# Patient Record
Sex: FEMALE | Race: OTHER | HISPANIC OR LATINO | Employment: UNEMPLOYED | URBAN - METROPOLITAN AREA
[De-identification: names, ages, dates, MRNs, and addresses within clinical notes are randomized per-mention and may not be internally consistent; named-entity substitution may affect disease eponyms.]

---

## 2017-04-04 ENCOUNTER — APPOINTMENT (EMERGENCY)
Dept: CT IMAGING | Facility: HOSPITAL | Age: 27
End: 2017-04-04

## 2017-04-04 ENCOUNTER — APPOINTMENT (EMERGENCY)
Dept: RADIOLOGY | Facility: HOSPITAL | Age: 27
End: 2017-04-04

## 2017-04-04 ENCOUNTER — HOSPITAL ENCOUNTER (EMERGENCY)
Facility: HOSPITAL | Age: 27
Discharge: HOME | End: 2017-04-05
Attending: EMERGENCY MEDICINE | Admitting: EMERGENCY MEDICINE

## 2017-04-04 DIAGNOSIS — R10.11 RIGHT UPPER QUADRANT ABDOMINAL PAIN: Primary | ICD-10-CM

## 2017-04-04 LAB
ALBUMIN SERPL BCP-MCNC: 3.7 G/DL (ref 3.5–5)
ALP SERPL-CCNC: 102 U/L (ref 46–116)
ALT SERPL W P-5'-P-CCNC: 17 U/L (ref 12–78)
ANION GAP SERPL CALCULATED.3IONS-SCNC: 8 MMOL/L (ref 4–13)
APTT PPP: 28 SECONDS (ref 24–36)
AST SERPL W P-5'-P-CCNC: 17 U/L (ref 5–45)
BASOPHILS # BLD AUTO: 0.03 THOUSANDS/ΜL (ref 0–0.1)
BASOPHILS NFR BLD AUTO: 0 % (ref 0–1)
BILIRUB SERPL-MCNC: 0.2 MG/DL (ref 0.2–1)
BUN SERPL-MCNC: 11 MG/DL (ref 5–25)
CALCIUM SERPL-MCNC: 9.1 MG/DL (ref 8.3–10.1)
CHLORIDE SERPL-SCNC: 101 MMOL/L (ref 100–108)
CO2 SERPL-SCNC: 30 MMOL/L (ref 21–32)
CREAT SERPL-MCNC: 0.77 MG/DL (ref 0.6–1.3)
EOSINOPHIL # BLD AUTO: 0.46 THOUSAND/ΜL (ref 0–0.61)
EOSINOPHIL NFR BLD AUTO: 3 % (ref 0–6)
ERYTHROCYTE [DISTWIDTH] IN BLOOD BY AUTOMATED COUNT: 13.2 % (ref 11.6–15.1)
GFR SERPL CREATININE-BSD FRML MDRD: >60 ML/MIN/1.73SQ M
GLUCOSE SERPL-MCNC: 117 MG/DL (ref 65–140)
HCT VFR BLD AUTO: 42.9 % (ref 34.8–46.1)
HGB BLD-MCNC: 14.3 G/DL (ref 11.5–15.4)
INR PPP: 1 (ref 0.86–1.16)
LYMPHOCYTES # BLD AUTO: 4.8 THOUSANDS/ΜL (ref 0.6–4.47)
LYMPHOCYTES NFR BLD AUTO: 35 % (ref 14–44)
MCH RBC QN AUTO: 28.1 PG (ref 26.8–34.3)
MCHC RBC AUTO-ENTMCNC: 33.3 G/DL (ref 31.4–37.4)
MCV RBC AUTO: 84 FL (ref 82–98)
MONOCYTES # BLD AUTO: 1.15 THOUSAND/ΜL (ref 0.17–1.22)
MONOCYTES NFR BLD AUTO: 8 % (ref 4–12)
NEUTROPHILS # BLD AUTO: 7.4 THOUSANDS/ΜL (ref 1.85–7.62)
NEUTS SEG NFR BLD AUTO: 54 % (ref 43–75)
PLATELET # BLD AUTO: 346 THOUSANDS/UL (ref 149–390)
PMV BLD AUTO: 9.4 FL (ref 8.9–12.7)
POTASSIUM SERPL-SCNC: 3.5 MMOL/L (ref 3.5–5.3)
PROT SERPL-MCNC: 8 G/DL (ref 6.4–8.2)
PROTHROMBIN TIME: 13 SECONDS (ref 12–14.3)
RBC # BLD AUTO: 5.09 MILLION/UL (ref 3.81–5.12)
SODIUM SERPL-SCNC: 139 MMOL/L (ref 136–145)
WBC # BLD AUTO: 13.84 THOUSAND/UL (ref 4.31–10.16)

## 2017-04-04 PROCEDURE — 93005 ELECTROCARDIOGRAM TRACING: CPT

## 2017-04-04 PROCEDURE — 85730 THROMBOPLASTIN TIME PARTIAL: CPT | Performed by: EMERGENCY MEDICINE

## 2017-04-04 PROCEDURE — 84702 CHORIONIC GONADOTROPIN TEST: CPT | Performed by: EMERGENCY MEDICINE

## 2017-04-04 PROCEDURE — 80053 COMPREHEN METABOLIC PANEL: CPT | Performed by: EMERGENCY MEDICINE

## 2017-04-04 PROCEDURE — 74177 CT ABD & PELVIS W/CONTRAST: CPT

## 2017-04-04 PROCEDURE — 83605 ASSAY OF LACTIC ACID: CPT | Performed by: EMERGENCY MEDICINE

## 2017-04-04 PROCEDURE — 85025 COMPLETE CBC W/AUTO DIFF WBC: CPT | Performed by: EMERGENCY MEDICINE

## 2017-04-04 PROCEDURE — 36415 COLL VENOUS BLD VENIPUNCTURE: CPT | Performed by: EMERGENCY MEDICINE

## 2017-04-04 PROCEDURE — 96374 THER/PROPH/DIAG INJ IV PUSH: CPT

## 2017-04-04 PROCEDURE — 85610 PROTHROMBIN TIME: CPT | Performed by: EMERGENCY MEDICINE

## 2017-04-04 PROCEDURE — 93005 ELECTROCARDIOGRAM TRACING: CPT | Performed by: EMERGENCY MEDICINE

## 2017-04-04 PROCEDURE — 83690 ASSAY OF LIPASE: CPT | Performed by: EMERGENCY MEDICINE

## 2017-04-04 PROCEDURE — 96375 TX/PRO/DX INJ NEW DRUG ADDON: CPT

## 2017-04-04 PROCEDURE — 71020 HB CHEST X-RAY 2VW FRONTAL&LATL: CPT

## 2017-04-04 PROCEDURE — 96361 HYDRATE IV INFUSION ADD-ON: CPT

## 2017-04-04 RX ORDER — SODIUM CHLORIDE 9 MG/ML
125 INJECTION, SOLUTION INTRAVENOUS CONTINUOUS
Status: DISCONTINUED | OUTPATIENT
Start: 2017-04-04 | End: 2017-04-05 | Stop reason: HOSPADM

## 2017-04-04 RX ORDER — ONDANSETRON 2 MG/ML
4 INJECTION INTRAMUSCULAR; INTRAVENOUS ONCE
Status: COMPLETED | OUTPATIENT
Start: 2017-04-04 | End: 2017-04-04

## 2017-04-04 RX ORDER — ACETAMINOPHEN 325 MG/1
975 TABLET ORAL ONCE
Status: COMPLETED | OUTPATIENT
Start: 2017-04-05 | End: 2017-04-05

## 2017-04-04 RX ADMIN — HYDROMORPHONE HYDROCHLORIDE 0.5 MG: 1 INJECTION, SOLUTION INTRAMUSCULAR; INTRAVENOUS; SUBCUTANEOUS at 23:36

## 2017-04-04 RX ADMIN — ONDANSETRON 4 MG: 2 INJECTION INTRAMUSCULAR; INTRAVENOUS at 23:35

## 2017-04-04 RX ADMIN — SODIUM CHLORIDE 1000 ML: 0.9 INJECTION, SOLUTION INTRAVENOUS at 23:30

## 2017-04-04 RX ADMIN — FAMOTIDINE 20 MG: 10 INJECTION, SOLUTION INTRAVENOUS at 23:39

## 2017-04-04 RX ADMIN — IOHEXOL 50 ML: 240 INJECTION, SOLUTION INTRATHECAL; INTRAVASCULAR; INTRAVENOUS; ORAL at 23:45

## 2017-04-05 VITALS
WEIGHT: 169.31 LBS | OXYGEN SATURATION: 99 % | RESPIRATION RATE: 18 BRPM | SYSTOLIC BLOOD PRESSURE: 137 MMHG | DIASTOLIC BLOOD PRESSURE: 63 MMHG | TEMPERATURE: 97.7 F | BODY MASS INDEX: 33.07 KG/M2 | HEART RATE: 105 BPM

## 2017-04-05 LAB
ATRIAL RATE: 80 BPM
B-HCG SERPL-ACNC: <2 MIU/ML
BACTERIA UR QL AUTO: ABNORMAL /HPF
BILIRUB UR QL STRIP: NEGATIVE
CLARITY UR: CLEAR
COLOR UR: YELLOW
GLUCOSE UR STRIP-MCNC: NEGATIVE MG/DL
HGB UR QL STRIP.AUTO: ABNORMAL
KETONES UR STRIP-MCNC: NEGATIVE MG/DL
LACTATE SERPL-SCNC: 1.5 MMOL/L (ref 0.5–2)
LEUKOCYTE ESTERASE UR QL STRIP: NEGATIVE
LIPASE SERPL-CCNC: 155 U/L (ref 73–393)
MUCOUS THREADS UR QL AUTO: ABNORMAL
NITRITE UR QL STRIP: NEGATIVE
NON-SQ EPI CELLS URNS QL MICRO: ABNORMAL /HPF
P AXIS: 45 DEGREES
PH UR STRIP.AUTO: 5.5 [PH] (ref 4.5–8)
PR INTERVAL: 144 MS
PROT UR STRIP-MCNC: ABNORMAL MG/DL
QRS AXIS: 80 DEGREES
QRSD INTERVAL: 82 MS
QT INTERVAL: 348 MS
QTC INTERVAL: 401 MS
RBC #/AREA URNS AUTO: ABNORMAL /HPF
SP GR UR STRIP.AUTO: 1.02 (ref 1–1.03)
T WAVE AXIS: 55 DEGREES
UROBILINOGEN UR QL STRIP.AUTO: 1 E.U./DL
VENTRICULAR RATE: 80 BPM
WBC #/AREA URNS AUTO: ABNORMAL /HPF

## 2017-04-05 PROCEDURE — 96376 TX/PRO/DX INJ SAME DRUG ADON: CPT

## 2017-04-05 PROCEDURE — 81001 URINALYSIS AUTO W/SCOPE: CPT | Performed by: EMERGENCY MEDICINE

## 2017-04-05 PROCEDURE — 99285 EMERGENCY DEPT VISIT HI MDM: CPT

## 2017-04-05 PROCEDURE — 96361 HYDRATE IV INFUSION ADD-ON: CPT

## 2017-04-05 PROCEDURE — 87086 URINE CULTURE/COLONY COUNT: CPT | Performed by: EMERGENCY MEDICINE

## 2017-04-05 RX ORDER — ONDANSETRON 2 MG/ML
4 INJECTION INTRAMUSCULAR; INTRAVENOUS ONCE
Status: COMPLETED | OUTPATIENT
Start: 2017-04-05 | End: 2017-04-05

## 2017-04-05 RX ORDER — METOCLOPRAMIDE 10 MG/1
10 TABLET ORAL 4 TIMES DAILY
Qty: 28 TABLET | Refills: 0 | Status: SHIPPED | OUTPATIENT
Start: 2017-04-05 | End: 2017-04-12

## 2017-04-05 RX ORDER — OXYCODONE HYDROCHLORIDE AND ACETAMINOPHEN 5; 325 MG/1; MG/1
1 TABLET ORAL EVERY 4 HOURS PRN
Qty: 20 TABLET | Refills: 0 | Status: SHIPPED | OUTPATIENT
Start: 2017-04-05 | End: 2017-04-15

## 2017-04-05 RX ADMIN — ACETAMINOPHEN 975 MG: 325 TABLET ORAL at 01:09

## 2017-04-05 RX ADMIN — IOHEXOL 100 ML: 350 INJECTION, SOLUTION INTRAVENOUS at 01:45

## 2017-04-05 RX ADMIN — ONDANSETRON 4 MG: 2 INJECTION INTRAMUSCULAR; INTRAVENOUS at 01:03

## 2017-04-06 LAB — BACTERIA UR CULT: NORMAL

## 2017-06-04 ENCOUNTER — HOSPITAL ENCOUNTER (EMERGENCY)
Facility: HOSPITAL | Age: 27
Discharge: HOME | End: 2017-06-04
Attending: EMERGENCY MEDICINE | Admitting: EMERGENCY MEDICINE

## 2017-06-04 VITALS
OXYGEN SATURATION: 100 % | HEART RATE: 98 BPM | TEMPERATURE: 97.8 F | SYSTOLIC BLOOD PRESSURE: 112 MMHG | DIASTOLIC BLOOD PRESSURE: 69 MMHG | RESPIRATION RATE: 16 BRPM

## 2017-06-04 DIAGNOSIS — K29.70 GASTRITIS: ICD-10-CM

## 2017-06-04 DIAGNOSIS — R10.13 EPIGASTRIC PAIN: Primary | ICD-10-CM

## 2017-06-04 LAB
ALBUMIN SERPL BCP-MCNC: 3.8 G/DL (ref 3.5–5)
ALP SERPL-CCNC: 106 U/L (ref 46–116)
ALT SERPL W P-5'-P-CCNC: 22 U/L (ref 12–78)
ANION GAP SERPL CALCULATED.3IONS-SCNC: 10 MMOL/L (ref 4–13)
AST SERPL W P-5'-P-CCNC: 22 U/L (ref 5–45)
ATRIAL RATE: 81 BPM
BASOPHILS # BLD AUTO: 0.03 THOUSANDS/ΜL (ref 0–0.1)
BASOPHILS NFR BLD AUTO: 0 % (ref 0–1)
BILIRUB SERPL-MCNC: 0.5 MG/DL (ref 0.2–1)
BUN SERPL-MCNC: 9 MG/DL (ref 5–25)
CALCIUM SERPL-MCNC: 9.5 MG/DL (ref 8.3–10.1)
CHLORIDE SERPL-SCNC: 101 MMOL/L (ref 100–108)
CLARITY, POC: CLEAR
CO2 SERPL-SCNC: 26 MMOL/L (ref 21–32)
COLOR, POC: YELLOW
CREAT SERPL-MCNC: 0.85 MG/DL (ref 0.6–1.3)
EOSINOPHIL # BLD AUTO: 0.3 THOUSAND/ΜL (ref 0–0.61)
EOSINOPHIL NFR BLD AUTO: 3 % (ref 0–6)
ERYTHROCYTE [DISTWIDTH] IN BLOOD BY AUTOMATED COUNT: 12.9 % (ref 11.6–15.1)
EXT BILIRUBIN, UA: NORMAL
EXT BLOOD URINE: NORMAL
EXT GLUCOSE, UA: NORMAL
EXT KETONES: NORMAL
EXT NITRITE, UA: NORMAL
EXT PH, UA: 7
EXT PROTEIN, UA: NORMAL
EXT SPECIFIC GRAVITY, UA: 1.01
EXT UROBILINOGEN: NORMAL
GFR SERPL CREATININE-BSD FRML MDRD: >60 ML/MIN/1.73SQ M
GLUCOSE SERPL-MCNC: 127 MG/DL (ref 65–140)
HCG UR QL: NEGATIVE
HCT VFR BLD AUTO: 44.9 % (ref 34.8–46.1)
HGB BLD-MCNC: 14.9 G/DL (ref 11.5–15.4)
LIPASE SERPL-CCNC: 101 U/L (ref 73–393)
LYMPHOCYTES # BLD AUTO: 3.39 THOUSANDS/ΜL (ref 0.6–4.47)
LYMPHOCYTES NFR BLD AUTO: 31 % (ref 14–44)
MCH RBC QN AUTO: 27.6 PG (ref 26.8–34.3)
MCHC RBC AUTO-ENTMCNC: 33.2 G/DL (ref 31.4–37.4)
MCV RBC AUTO: 83 FL (ref 82–98)
MONOCYTES # BLD AUTO: 0.87 THOUSAND/ΜL (ref 0.17–1.22)
MONOCYTES NFR BLD AUTO: 8 % (ref 4–12)
NEUTROPHILS # BLD AUTO: 6.24 THOUSANDS/ΜL (ref 1.85–7.62)
NEUTS SEG NFR BLD AUTO: 58 % (ref 43–75)
P AXIS: 49 DEGREES
PLATELET # BLD AUTO: 350 THOUSANDS/UL (ref 149–390)
PMV BLD AUTO: 9.5 FL (ref 8.9–12.7)
POTASSIUM SERPL-SCNC: 3.3 MMOL/L (ref 3.5–5.3)
PR INTERVAL: 144 MS
PROT SERPL-MCNC: 8.3 G/DL (ref 6.4–8.2)
QRS AXIS: 78 DEGREES
QRSD INTERVAL: 88 MS
QT INTERVAL: 348 MS
QTC INTERVAL: 404 MS
RBC # BLD AUTO: 5.39 MILLION/UL (ref 3.81–5.12)
SODIUM SERPL-SCNC: 137 MMOL/L (ref 136–145)
T WAVE AXIS: 48 DEGREES
TROPONIN I SERPL-MCNC: <0.02 NG/ML
VENTRICULAR RATE: 81 BPM
WBC # BLD AUTO: 10.83 THOUSAND/UL (ref 4.31–10.16)
WBC # BLD EST: NORMAL 10*3/UL

## 2017-06-04 PROCEDURE — 84484 ASSAY OF TROPONIN QUANT: CPT | Performed by: EMERGENCY MEDICINE

## 2017-06-04 PROCEDURE — 80053 COMPREHEN METABOLIC PANEL: CPT | Performed by: EMERGENCY MEDICINE

## 2017-06-04 PROCEDURE — 81002 URINALYSIS NONAUTO W/O SCOPE: CPT | Performed by: EMERGENCY MEDICINE

## 2017-06-04 PROCEDURE — 81025 URINE PREGNANCY TEST: CPT | Performed by: EMERGENCY MEDICINE

## 2017-06-04 PROCEDURE — 36415 COLL VENOUS BLD VENIPUNCTURE: CPT | Performed by: EMERGENCY MEDICINE

## 2017-06-04 PROCEDURE — 96361 HYDRATE IV INFUSION ADD-ON: CPT

## 2017-06-04 PROCEDURE — 99284 EMERGENCY DEPT VISIT MOD MDM: CPT

## 2017-06-04 PROCEDURE — 93005 ELECTROCARDIOGRAM TRACING: CPT | Performed by: EMERGENCY MEDICINE

## 2017-06-04 PROCEDURE — 96374 THER/PROPH/DIAG INJ IV PUSH: CPT

## 2017-06-04 PROCEDURE — 83690 ASSAY OF LIPASE: CPT | Performed by: EMERGENCY MEDICINE

## 2017-06-04 PROCEDURE — 85025 COMPLETE CBC W/AUTO DIFF WBC: CPT | Performed by: EMERGENCY MEDICINE

## 2017-06-04 RX ORDER — PANTOPRAZOLE SODIUM 40 MG/1
40 TABLET, DELAYED RELEASE ORAL DAILY
Qty: 20 TABLET | Refills: 0 | Status: SHIPPED | OUTPATIENT
Start: 2017-06-04 | End: 2017-11-07 | Stop reason: ALTCHOICE

## 2017-06-04 RX ORDER — SUCRALFATE ORAL 1 G/10ML
1 SUSPENSION ORAL 4 TIMES DAILY
Qty: 420 ML | Refills: 0 | Status: SHIPPED | OUTPATIENT
Start: 2017-06-04 | End: 2017-11-07 | Stop reason: ALTCHOICE

## 2017-06-04 RX ORDER — POTASSIUM CHLORIDE 20 MEQ/1
40 TABLET, EXTENDED RELEASE ORAL ONCE
Status: COMPLETED | OUTPATIENT
Start: 2017-06-04 | End: 2017-06-04

## 2017-06-04 RX ORDER — MORPHINE SULFATE 4 MG/ML
4 INJECTION, SOLUTION INTRAMUSCULAR; INTRAVENOUS ONCE
Status: COMPLETED | OUTPATIENT
Start: 2017-06-04 | End: 2017-06-04

## 2017-06-04 RX ADMIN — MORPHINE SULFATE 4 MG: 4 INJECTION, SOLUTION INTRAMUSCULAR; INTRAVENOUS at 11:16

## 2017-06-04 RX ADMIN — SODIUM CHLORIDE 1000 ML: 0.9 INJECTION, SOLUTION INTRAVENOUS at 11:12

## 2017-06-04 RX ADMIN — POTASSIUM CHLORIDE 40 MEQ: 1500 TABLET, EXTENDED RELEASE ORAL at 13:05

## 2017-09-05 ENCOUNTER — HOSPITAL ENCOUNTER (EMERGENCY)
Facility: HOSPITAL | Age: 27
Discharge: LEFT BEFORE DISCHARGE | End: 2017-09-05

## 2017-09-05 VITALS
DIASTOLIC BLOOD PRESSURE: 81 MMHG | TEMPERATURE: 98.6 F | HEART RATE: 98 BPM | WEIGHT: 168.5 LBS | RESPIRATION RATE: 16 BRPM | SYSTOLIC BLOOD PRESSURE: 123 MMHG | BODY MASS INDEX: 32.91 KG/M2 | OXYGEN SATURATION: 96 %

## 2017-09-05 LAB
ATRIAL RATE: 105 BPM
P AXIS: 55 DEGREES
PR INTERVAL: 124 MS
QRS AXIS: 80 DEGREES
QRSD INTERVAL: 82 MS
QT INTERVAL: 328 MS
QTC INTERVAL: 417 MS
T WAVE AXIS: 43 DEGREES
VENTRICULAR RATE: 97 BPM

## 2017-09-05 PROCEDURE — 99284 EMERGENCY DEPT VISIT MOD MDM: CPT

## 2017-09-05 PROCEDURE — 93005 ELECTROCARDIOGRAM TRACING: CPT

## 2017-11-07 ENCOUNTER — HOSPITAL ENCOUNTER (EMERGENCY)
Facility: HOSPITAL | Age: 27
Discharge: HOME | End: 2017-11-07
Attending: EMERGENCY MEDICINE | Admitting: EMERGENCY MEDICINE

## 2017-11-07 VITALS
OXYGEN SATURATION: 100 % | HEIGHT: 60 IN | HEART RATE: 77 BPM | RESPIRATION RATE: 16 BRPM | SYSTOLIC BLOOD PRESSURE: 145 MMHG | BODY MASS INDEX: 32.3 KG/M2 | TEMPERATURE: 98.1 F | WEIGHT: 164.5 LBS | DIASTOLIC BLOOD PRESSURE: 83 MMHG

## 2017-11-07 DIAGNOSIS — K29.70 GASTRITIS: Primary | ICD-10-CM

## 2017-11-07 PROCEDURE — 99283 EMERGENCY DEPT VISIT LOW MDM: CPT

## 2017-11-07 RX ORDER — MAGNESIUM HYDROXIDE/ALUMINUM HYDROXICE/SIMETHICONE 120; 1200; 1200 MG/30ML; MG/30ML; MG/30ML
30 SUSPENSION ORAL ONCE
Status: COMPLETED | OUTPATIENT
Start: 2017-11-07 | End: 2017-11-07

## 2017-11-07 RX ORDER — FAMOTIDINE 20 MG/1
40 TABLET, FILM COATED ORAL ONCE
Status: COMPLETED | OUTPATIENT
Start: 2017-11-07 | End: 2017-11-07

## 2017-11-07 RX ORDER — FAMOTIDINE 20 MG/1
20 TABLET, FILM COATED ORAL 2 TIMES DAILY
Qty: 30 TABLET | Refills: 0 | Status: SHIPPED | OUTPATIENT
Start: 2017-11-07 | End: 2018-10-19

## 2017-11-07 RX ADMIN — FAMOTIDINE 40 MG: 20 TABLET ORAL at 02:29

## 2017-11-07 RX ADMIN — ALUMINUM HYDROXIDE, MAGNESIUM HYDROXIDE, AND SIMETHICONE 30 ML: 200; 200; 20 SUSPENSION ORAL at 02:30

## 2017-11-07 RX ADMIN — LIDOCAINE HYDROCHLORIDE 15 ML: 20 SOLUTION ORAL; TOPICAL at 02:30

## 2017-11-07 NOTE — ED NOTES
Pt states that she has been seen here 2 months ago for exact same symptoms  Had been prescribed protonix and carafate at that time  Pt states medications did relieve symptoms  Pt has been told to follow up with a GI doc however needs to wait until her health insurance kicks in    Pt currently taking no meds     Richie Bennett RN  11/07/17 0846

## 2017-11-07 NOTE — DISCHARGE INSTRUCTIONS
Gastritis   WHAT YOU NEED TO KNOW:   What is gastritis? Gastritis is inflammation or irritation of the lining of your stomach  What increases my risk for gastritis? · Infection with bacteria, a virus, or a parasite    · NSAIDs, aspirin, or steroid medicine    · Use of tobacco products or alcohol    · Trauma such as an injury to your stomach or intestine    · Autoimmune disorders such as diabetes, thyroid disease, or Crohn disease    · Stress    · Age older than 60 years    · Illegal drugs, such as cocaine  What are the signs and symptoms of gastritis? · Stomach pain, burning, or tenderness when you press on it    · Stomach fullness or tightness    · Nausea or vomiting    · Loss of appetite, or feeling full quickly when you eat    · Bad breath    · Fatigue or feeling more tired than usual    · Heartburn  How is gastritis diagnosed? Your healthcare provider will ask about your signs and symptoms and examine you  You may need any of the following:  · Blood tests  may be used to show an infection, dehydration, or anemia (low red blood cell levels)  · A bowel movement sample  may be tested for blood or the germ that may be causing your gastritis  · A breath test  may show if H pylori is causing your gastritis  You will be given a liquid to drink  Then you will breathe into a bag  Your healthcare provider will measure the amount of carbon dioxide in your breath  Extra amounts of carbon dioxide may mean you have an H pylori infection  · An endoscopy  may be used to look for irritation or bleeding in your stomach  Your healthcare provider will use an endoscope (tube with a light and camera on the end) during the procedure  He or she may take a sample from your stomach to be tested  How is gastritis treated? Your symptoms may go away without treatment  Treatment will depend on what is causing your gastritis  Your healthcare provider may recommend changes to the medicines you take   Medicines may be given to help treat a bacterial infection or decrease stomach acid  How can I manage or prevent gastritis? · Do not smoke  Nicotine and other chemicals in cigarettes and cigars can make your symptoms worse and cause lung damage  Ask your healthcare provider for information if you currently smoke and need help to quit  E-cigarettes or smokeless tobacco still contain nicotine  Talk to your healthcare provider before you use these products  · Do not drink alcohol  Alcohol can prevent healing and make your gastritis worse  Talk to your healthcare provider if you need help to stop drinking  · Do not take NSAIDs or aspirin unless directed  These and similar medicines can cause irritation of your stomach lining  If your healthcare provider says it is okay to take NSAIDs, take them with food  · Do not eat foods that cause irritation  Foods such as oranges and salsa can cause burning or pain  Eat a variety of healthy foods  Examples include fruits (not citrus), vegetables, low-fat dairy products, beans, whole-grain breads, and lean meats and fish  Try to eat small meals, and drink water with your meals  Do not eat for at least 3 hours before you go to bed  · Find ways to relax and decrease stress  Stress can increase stomach acid and make gastritis worse  Activities such as yoga, meditation, or listening to music can help you relax  Spend time with friends, or do things you enjoy  Call 911 for any of the following:   · You develop chest pain or shortness of breath  When should I seek immediate care? · You vomit blood  · You have black or bloody bowel movements  · You have severe stomach or back pain  When should I contact my healthcare provider? · You have a fever  · You have new or worsening symptoms, even after treatment  · You have questions or concerns about your condition or care  CARE AGREEMENT:   You have the right to help plan your care   Learn about your health condition and how it may be treated  Discuss treatment options with your caregivers to decide what care you want to receive  You always have the right to refuse treatment  The above information is an  only  It is not intended as medical advice for individual conditions or treatments  Talk to your doctor, nurse or pharmacist before following any medical regimen to see if it is safe and effective for you  © 2017 [employer] Information is for End User's use only and may not be sold, redistributed or otherwise used for commercial purposes  All illustrations and images included in CareNotes® are the copyrighted property of [employer]  or [employer] Dodd  Gastritis   AMBULATORY CARE:   Gastritis  is inflammation or irritation of the lining of your stomach  Common symptoms include the following:   · Stomach pain, burning, or tenderness when you press on it    · Stomach fullness or tightness    · Nausea or vomiting    · Loss of appetite, or feeling full quickly when you eat    · Bad breath    · Fatigue or feeling more tired than usual    · Heartburn  Call 911 for any of the following:   · You develop chest pain or shortness of breath  Seek care immediately if:   · You vomit blood  · You have black or bloody bowel movements  · You have severe stomach or back pain  Contact your healthcare provider if:   · You have a fever  · You have new or worsening symptoms, even after treatment  · You have questions or concerns about your condition or care  Treatment for gastritis:  Your symptoms may go away without treatment  Treatment will depend on what is causing your gastritis  Your healthcare provider may recommend changes to the medicines you take  Medicines may be given to help treat a bacterial infection or decrease stomach acid  Manage or prevent gastritis:   · Do not smoke    Nicotine and other chemicals in cigarettes and cigars can make your symptoms worse and cause lung damage  Ask your healthcare provider for information if you currently smoke and need help to quit  E-cigarettes or smokeless tobacco still contain nicotine  Talk to your healthcare provider before you use these products  · Do not drink alcohol  Alcohol can prevent healing and make your gastritis worse  Talk to your healthcare provider if you need help to stop drinking  · Do not take NSAIDs or aspirin unless directed  These and similar medicines can cause irritation  If your healthcare provider says it is okay to take NSAIDs, take them with food  · Do not eat foods that cause irritation  Foods such as oranges and salsa can cause burning or pain  Eat a variety of healthy foods  Examples include fruits (not citrus), vegetables, low-fat dairy products, beans, whole-grain breads, and lean meats and fish  Try to eat small meals, and drink water with your meals  Do not eat for at least 3 hours before you go to bed  · Find ways to relax and decrease stress  Stress can increase stomach acid and make gastritis worse  Activities such as yoga, meditation, or listening to music can help you relax  Spend time with friends, or do things you enjoy  Follow up with your healthcare provider as directed: You may need ongoing tests or treatment, or referral to a gastroenterologist  Write down your questions so you remember to ask them during your visits  © 2017 [employer]  Information is for End User's use only and may not be sold, redistributed or otherwise used for commercial purposes  All illustrations and images included in CareNotes® are the copyrighted property of [employer]  or [employer]  The above information is an  only  It is not intended as medical advice for individual conditions or treatments  Talk to your doctor, nurse or pharmacist before following any medical regimen to see if it is safe and effective for you

## 2017-11-07 NOTE — ED PROVIDER NOTES
History  Chief Complaint   Patient presents with    Epigastric Pain     Pt presents to ED for evaluation and treatment of epigastric pain/burning which has been intermittent x 2 days  Fatimaho reports diarrhea  Pt states pain is worse when laying down     Patient presents to the emergency department with epigastric pain for the last 24 hours  He describes it as a burning that shoots up into her chest   She was here in June for similar was placed on anti ulcer medications but she ran out of these medications  She denies fever chills back or lower belly pain  Denies melena or rectal bleeding  Denies urinary symptoms  Denies trauma fall or injury  None       No past medical history on file  No past surgical history on file  Family History   Problem Relation Age of Onset    Asthma Mother     Stroke Mother     No Known Problems Father     No Known Problems Sister     No Known Problems Brother     No Known Problems Daughter     No Known Problems Son     No Known Problems Maternal Aunt     No Known Problems Maternal Uncle     No Known Problems Paternal Aunt     No Known Problems Paternal Uncle     No Known Problems Maternal Grandmother     Hypertension Maternal Grandfather     No Known Problems Paternal Grandmother     No Known Problems Paternal Grandfather      I have reviewed and agree with the history as documented  Social History   Substance Use Topics    Smoking status: Never Smoker    Smokeless tobacco: Never Used    Alcohol use No        Review of Systems   Constitutional: Negative  Negative for activity change, appetite change, chills, diaphoresis, fatigue and fever  HENT: Negative  Eyes: Negative  Negative for photophobia and visual disturbance  Respiratory: Negative  Negative for chest tightness and shortness of breath  Cardiovascular: Negative  Negative for chest pain, palpitations and leg swelling     Gastrointestinal: Positive for abdominal pain, nausea and vomiting  Negative for abdominal distention, anal bleeding, blood in stool, constipation, diarrhea and rectal pain  Endocrine: Negative  Genitourinary: Negative  Negative for difficulty urinating, dysuria, frequency, pelvic pain, urgency, vaginal bleeding, vaginal discharge and vaginal pain  Musculoskeletal: Negative  Negative for back pain and neck pain  Skin: Negative  Negative for rash and wound  Allergic/Immunologic: Negative  Neurological: Negative  Negative for dizziness, seizures, syncope and weakness  Hematological: Negative  Does not bruise/bleed easily  Psychiatric/Behavioral: Negative  Physical Exam  ED Triage Vitals [11/07/17 0219]   Temperature Pulse Respirations Blood Pressure SpO2   98 1 °F (36 7 °C) 77 16 145/83 100 %      Temp Source Heart Rate Source Patient Position - Orthostatic VS BP Location FiO2 (%)   Oral Monitor Lying Right arm --      Pain Score       Worst Possible Pain           Orthostatic Vital Signs  Vitals:    11/07/17 0219   BP: 145/83   Pulse: 77   Patient Position - Orthostatic VS: Lying       Physical Exam   Constitutional: She is oriented to person, place, and time  She appears well-developed and well-nourished  Nontoxic without respiratory distress  Patient is visibly uncomfortable  HENT:   Head: Normocephalic and atraumatic  Right Ear: External ear normal    Left Ear: External ear normal    Mouth/Throat: Oropharynx is clear and moist    Eyes: Conjunctivae and EOM are normal  Pupils are equal, round, and reactive to light  Neck: Normal range of motion  Neck supple  Cardiovascular: Normal rate, regular rhythm, normal heart sounds and intact distal pulses  Pulmonary/Chest: Effort normal and breath sounds normal  No respiratory distress  She has no wheezes  She has no rales  She exhibits no tenderness  Abdominal: Soft  Bowel sounds are normal  She exhibits no distension and no mass  There is tenderness   There is no rebound and no guarding  No hernia  Mild left lower quadrant tenderness to palpation  No hernias or peritoneal signs  Musculoskeletal: Normal range of motion  She exhibits no edema, tenderness or deformity  Neurological: She is alert and oriented to person, place, and time  She has normal reflexes  She displays normal reflexes  No cranial nerve deficit or sensory deficit  She exhibits normal muscle tone  Coordination normal    Skin: Skin is warm and dry  No rash noted  Psychiatric: Her behavior is normal  Judgment and thought content normal    Anxious affect   Nursing note and vitals reviewed  ED Medications  Medications   aluminum-magnesium hydroxide-simethicone (MYLANTA) 200-200-20 mg/5 mL oral suspension 30 mL (30 mL Oral Given 11/7/17 0230)   lidocaine viscous (XYLOCAINE) 2 % mucosal solution 15 mL (15 mL Swish & Spit Given 11/7/17 0230)   famotidine (PEPCID) tablet 40 mg (40 mg Oral Given 11/7/17 0229)       Diagnostic Studies  Results Reviewed     None                 No orders to display              Procedures  Procedures       Phone Contacts  ED Phone Contact    ED Course  ED Course as of Nov 07 0317 Tue Nov 07, 2017 0315 Patient comfortable at discharge  She is asymptomatic at discharge  Will discharge on Pepcid  MDM  CritCare Time    Disposition  Final diagnoses:   Gastritis     Time reflects when diagnosis was documented in both MDM as applicable and the Disposition within this note     Time User Action Codes Description Comment    11/7/2017  3:16 AM Real John 58 [K29 70] Gastritis       ED Disposition     ED Disposition Condition Comment    Discharge  Will Crawford discharge to home/self care      Condition at discharge: Stable        Follow-up Information     Follow up With Specialties Details Why [facility] physician  Schedule an appointment as soon as possible for a visit          Patient's Medications   Discharge Prescriptions    FAMOTIDINE (PEPCID) 20 MG TABLET    Take 1 tablet by mouth 2 (two) times a day       Start Date: 11/7/2017 End Date: --       Order Dose: 20 mg       Quantity: 30 tablet    Refills: 0     No discharge procedures on file      ED Provider  Electronically Signed by           Avila Tran MD  11/07/17 7022

## 2017-11-09 ENCOUNTER — HOSPITAL ENCOUNTER (EMERGENCY)
Facility: HOSPITAL | Age: 27
Discharge: HOME | End: 2017-11-09
Attending: EMERGENCY MEDICINE | Admitting: EMERGENCY MEDICINE

## 2017-11-09 VITALS
SYSTOLIC BLOOD PRESSURE: 134 MMHG | OXYGEN SATURATION: 100 % | HEART RATE: 78 BPM | DIASTOLIC BLOOD PRESSURE: 76 MMHG | TEMPERATURE: 97.5 F | RESPIRATION RATE: 18 BRPM

## 2017-11-09 DIAGNOSIS — R10.13 EPIGASTRIC PAIN: Primary | ICD-10-CM

## 2017-11-09 PROCEDURE — 99283 EMERGENCY DEPT VISIT LOW MDM: CPT

## 2017-11-09 RX ORDER — SUCRALFATE ORAL 1 G/10ML
1 SUSPENSION ORAL 4 TIMES DAILY
Qty: 420 ML | Refills: 0 | Status: SHIPPED | OUTPATIENT
Start: 2017-11-09 | End: 2018-10-19

## 2017-11-09 RX ORDER — MAGNESIUM HYDROXIDE/ALUMINUM HYDROXICE/SIMETHICONE 120; 1200; 1200 MG/30ML; MG/30ML; MG/30ML
30 SUSPENSION ORAL ONCE
Status: COMPLETED | OUTPATIENT
Start: 2017-11-09 | End: 2017-11-09

## 2017-11-09 RX ADMIN — ALUMINUM HYDROXIDE, MAGNESIUM HYDROXIDE, AND SIMETHICONE 30 ML: 200; 200; 20 SUSPENSION ORAL at 02:18

## 2017-11-09 RX ADMIN — LIDOCAINE HYDROCHLORIDE 10 ML: 20 SOLUTION ORAL; TOPICAL at 02:19

## 2017-11-09 NOTE — DISCHARGE INSTRUCTIONS
Abdominal Pain   WHAT YOU NEED TO KNOW:   Abdominal pain can be dull, achy, or sharp  You may have pain in one area of your abdomen, or in your entire abdomen  Your pain may be caused by a condition such as constipation, food sensitivity or poisoning, infection, or a blockage  Abdominal pain can also be from a hernia, appendicitis, or an ulcer  Liver, gallbladder, or kidney conditions can also cause abdominal pain  The cause of your abdominal pain may be unknown  DISCHARGE INSTRUCTIONS:   Return to the emergency department if:   · You have new chest pain or shortness of breath  · You have pulsing pain in your upper abdomen or lower back that suddenly becomes constant  · Your pain is in the right lower abdominal area and worsens with movement  · You have a fever over 100 4°F (38°C) or shaking chills  · You are vomiting and cannot keep food or liquids down  · Your pain does not improve or gets worse over the next 8 to 12 hours  · You see blood in your vomit or bowel movements, or they look black and tarry  · Your skin or the whites of your eyes turn yellow  · You are a woman and have a large amount of vaginal bleeding that is not your monthly period  Contact your healthcare provider if:   · You have pain in your lower back  · You are a man and have pain in your testicles  · You have pain when you urinate  · You have questions or concerns about your condition or care  Follow up with your healthcare provider within 24 hours or as directed:  Write down your questions so you remember to ask them during your visits  Medicines:   · Medicines  may be given to calm your stomach and prevent vomiting or to decrease pain  Ask how to take pain medicine safely  · Take your medicine as directed  Contact your healthcare provider if you think your medicine is not helping or if you have side effects  Tell him of her if you are allergic to any medicine   Keep a list of the medicines, vitamins, and herbs you take  Include the amounts, and when and why you take them  Bring the list or the pill bottles to follow-up visits  Carry your medicine list with you in case of an emergency  © 2017 [employer]  Information is for End User's use only and may not be sold, redistributed or otherwise used for commercial purposes  All illustrations and images included in CareNotes® are the copyrighted property of [employer]  or Ben Dodd  The above information is an  only  It is not intended as medical advice for individual conditions or treatments  Talk to your doctor, nurse or pharmacist before following any medical regimen to see if it is safe and effective for you  Epigastric Pain   WHAT YOU NEED TO KNOW:   Epigastric pain is felt in the middle of the upper abdomen, between the ribs and the bellybutton  The pain may be mild or severe  Pain may spread from or to another part of your body  Epigastric pain may be a sign of a serious health problem that needs to be treated  DISCHARGE INSTRUCTIONS:   Call 911 for any of the following:   · You have any of the following signs of a heart attack:      ¨ Squeezing, pressure, or pain in your chest that lasts longer than 5 minutes or returns    ¨ Discomfort or pain in your back, neck, jaw, stomach, or arm     ¨ Trouble breathing    ¨ Nausea or vomiting    ¨ Lightheadedness or a sudden cold sweat, especially with chest pain or trouble breathing    · You have severe pain that radiates to your jaw or back  Return to the emergency department if:   · You have severe pain that starts suddenly and quickly gets worse  · You cannot have a bowel movement and are vomiting  · You vomit or cough up blood  · You see blood in your urine or bowel movement  · You feel drowsy and your breathing is slower than usual   Contact your healthcare provider if:   · You have a fever or chills      · You have yellowing of your skin or the whites of your eyes  · You vomit often or several times in a row  · You lose weight without trying  · You have symptoms for longer than 2 weeks  · You have questions or concerns about your condition or care  Medicines:   · Medicines  may be given to treat pain or stop vomiting  You may also need medicines to reduce or control stomach acid, or treat an infection  · Take your medicine as directed  Contact your healthcare provider if you think your medicine is not helping or if you have side effects  Tell him of her if you are allergic to any medicine  Keep a list of the medicines, vitamins, and herbs you take  Include the amounts, and when and why you take them  Bring the list or the pill bottles to follow-up visits  Carry your medicine list with you in case of an emergency  Follow up with your healthcare provider as directed:  Write down your questions so you remember to ask them during your visits  Manage your symptoms:   · Keep a record of your symptoms  Include when the pain starts, how long it lasts, and if it is sharp or dull  Also include any foods you ate or activities you did before the pain started  Keep track of anything that helped the pain  · Eat a variety of healthy foods  Healthy foods include fruits, vegetables, whole-grain breads, low-fat dairy products, beans, lean meats, and fish  Ask if you need to be on a special diet  Certain foods may cause your pain, such as alcohol or foods that are high in fat  You may need to eat smaller meals and to eat more often than usual     · Drink liquids as directed  Ask how much liquid to drink each day and which liquids are best for you  Do not have drinks that contain alcohol or caffeine  © 2017 [address] [facility] Information is for End User's use only and may not be sold, redistributed or otherwise used for commercial purposes   All illustrations and images included in CareNotes® are the copyrighted property of [employer]NA [employer] Mckenna  or [employer]  The above information is an  only  It is not intended as medical advice for individual conditions or treatments  Talk to your doctor, nurse or pharmacist before following any medical regimen to see if it is safe and effective for you

## 2017-11-09 NOTE — ED PROVIDER NOTES
History  Chief Complaint   Patient presents with    Epigastric Pain     Pt c/o increased epigastric pain since being evaluated here 2 days ago  This 61-year-old female with a history of reflux presents tonight with epigastric pain  Patient states she had hamburger for dinner and a couple hours later she began having a burning sensation rated from epigastrium up into her chest   Patient was seen emergency department two nights ago for similar symptoms  Patient denies any vomiting, fevers, diarrhea, constipation  Patient denies any cough or shortness of breath  History provided by:  Patient   used: No    Abdominal Pain   Pain location:  Epigastric  Pain quality: burning and fullness    Pain radiates to:  Chest  Pain severity:  Moderate  Onset quality:  Gradual  Duration:  4 hours  Timing:  Constant  Progression:  Worsening  Chronicity:  Recurrent  Relieved by:  Nothing  Worsened by:  Nothing  Ineffective treatments:  Acetaminophen and OTC medications  Associated symptoms: no chest pain, no chills, no constipation, no cough, no diarrhea, no fever, no nausea, no shortness of breath and no vomiting        Prior to Admission Medications   Prescriptions Last Dose Informant Patient Reported? Taking?   famotidine (PEPCID) 20 mg tablet   No No   Sig: Take 1 tablet by mouth 2 (two) times a day      Facility-Administered Medications: None       History reviewed  No pertinent past medical history  History reviewed  No pertinent surgical history      Family History   Problem Relation Age of Onset    Asthma Mother     Stroke Mother     No Known Problems Father     No Known Problems Sister     No Known Problems Brother     No Known Problems Daughter     No Known Problems Son     No Known Problems Maternal Aunt     No Known Problems Maternal Uncle     No Known Problems Paternal Aunt     No Known Problems Paternal Uncle     No Known Problems Maternal Grandmother     Hypertension Maternal Grandfather     No Known Problems Paternal Grandmother     No Known Problems Paternal Grandfather      I have reviewed and agree with the history as documented  Social History   Substance Use Topics    Smoking status: Never Smoker    Smokeless tobacco: Never Used    Alcohol use No        Review of Systems   Constitutional: Negative for activity change, appetite change, chills and fever  Respiratory: Positive for chest tightness  Negative for cough, shortness of breath and wheezing  Cardiovascular: Negative for chest pain and leg swelling  Gastrointestinal: Positive for abdominal pain  Negative for abdominal distention, constipation, diarrhea, nausea and vomiting  Musculoskeletal: Negative for arthralgias, back pain and myalgias  Neurological: Negative for dizziness, weakness, light-headedness and headaches         Physical Exam  ED Triage Vitals [11/09/17 0144]   Temperature Pulse Respirations Blood Pressure SpO2   97 5 °F (36 4 °C) 83 20 139/84 100 %      Temp Source Heart Rate Source Patient Position - Orthostatic VS BP Location FiO2 (%)   Oral Monitor Lying Right arm --      Pain Score       Worst Possible Pain           Orthostatic Vital Signs  Vitals:    11/09/17 0144 11/09/17 0302   BP: 139/84 134/76   Pulse: 83 78   Patient Position - Orthostatic VS: Lying Lying       Physical Exam    ED Medications  Medications   aluminum-magnesium hydroxide-simethicone (MYLANTA) 200-200-20 mg/5 mL oral suspension 30 mL (30 mL Oral Given 11/9/17 0218)   lidocaine viscous (XYLOCAINE) 2 % mucosal solution 10 mL (10 mL Swish & Spit Given 11/9/17 0219)       Diagnostic Studies  Results Reviewed     None                 No orders to display              Procedures  Procedures       Phone Contacts  ED Phone Contact    ED Course  ED Course                                MDM  Number of Diagnoses or Management Options  Epigastric pain: established and worsening  Diagnosis management comments: Patient reports pain improved after GI cocktail  Will add Carafate, give GI referral     Risk of Complications, Morbidity, and/or Mortality  Presenting problems: moderate  Diagnostic procedures: moderate  Management options: low    Patient Progress  Patient progress: improved    CritCare Time    Disposition  Final diagnoses:   Epigastric pain     Time reflects when diagnosis was documented in both MDM as applicable and the Disposition within this note     Time User Action Codes Description Comment    11/9/2017  3:10 AM Cristel Castañeda Add [R10 13] Epigastric pain       ED Disposition     ED Disposition Condition Comment    Discharge  [facility] discharge to home/self care  Condition at discharge: Good        Follow-up Information     Follow up With Specialties Details Why Contact Info    Ariana Seymour MD Gastroenterology Call in 1 day For further evaluation of your pain [address] [facility]  [place] [address]  [phone]          Patient's Medications   Discharge Prescriptions    SUCRALFATE (CARAFATE) 1 G/10 ML SUSPENSION    Take 10 mL by mouth 4 (four) times a day       Start Date: 11/9/2017 End Date: --       Order Dose: 1 g       Quantity: 420 mL    Refills: 0     No discharge procedures on file      ED Provider  Electronically Signed by           Anisha Fallon MD  11/09/17 7271

## 2018-01-13 NOTE — PROGRESS NOTES
2016         RE: Ena De Paz                                To: [address]   MR#: [id number]                                    [address] [place] [address]   : AUG Aster 6, [address]   ENC: [id number]                             Fax: [phone]   (Exam #: [id number])      The LMP of this 23year old,  2, para 1 patient was unknown, her   working YASMANI is 2016 and the current gestational age is 42 weeks 5   days by [address] [place] [address]  A sonographic examination was performed on 2016 using real time equipment  The patient has a BMI of 35 5  Her   blood pressure today was 127/80  Earliest ultrasound found in her record: 6/22/15 7w3d 16 YASMANI            Sejal Gonzalez was evaluated in Triage yesterday in order to rule out    labor  By history, her cervical dilation was 3-4 cm  She has no complaints   today  Sejal Gonzalez reports regular fetal movements and denies problems   related to hypertension, gestational diabetes, or vaginal bleeding  Cardiac motion was observed at 144 bpm       INDICATIONS      obesity   increased nuchal translucency   single umbilical artery      Exam Types      Level I      RESULTS      Fetus # 1 of 1   Vertex presentation   Fetal growth appeared normal   Placenta Location = Left lateral, fundal   No placenta previa   Placenta Grade = II      MEASUREMENTS (* Included In Average GA)      AC              34 4 cm        38 weeks 4 days* (67%)   BPD              9 0 cm        36 weeks 4 days* (42%)   HC              33 4 cm        37 weeks 5 days* (45%)   Femur            6 7 cm        34 weeks 1 day * (<5%)      Cerebellum       5 0 cm        36 weeks 4 days      HC/AC           0 97   FL/AC           0 20   FL/BPD          0 74   EFW (Ac/Fl/Hc)  3145 grams - 6 lbs 15 oz                 (46%)      THE AVERAGE GESTATIONAL AGE is 36 weeks 6 days +/- 21 days        AMNIOTIC FLUID      Q1: 3 0 Q2: 2 6      Q3: 3 7      Q4: 6 1   KELLI Total = 15 4 cm   Amniotic Fluid: Normal      FETAL VESSELS                                     S/D   PI    RI    PSV   AEDV RF                                                    cm/s       Umbilical Artery           2 19  0 81  0 54        No   No      IMPRESSION      Bhagat IUP   36 weeks and 6 days by this ultrasound  (YASMANI=2016)   37 weeks and 5 days by 1st Tri Sono  (YASMANI=2016)   Vertex presentation   Fetal growth appeared normal   Regular fetal heart rate of 144 bpm   Left lateral, fundal placenta   No placenta previa      GENERAL COMMENT      A single umbilical artery is identified  The right atrium appears mildly   enlarged  No fetal structural abnormality is otherwise identified on the   Level I survey today  The fetal brain, heart including 3 vessel tracheal   and septal views, stomach, kidneys, bladder, diaphragm, and spine appear   normal   Fetal interval growth and amniotic fluid volume are normal       Today's ultrasound findings and suggested follow-up were discussed in   detail with Diaz Valadez  Daily third trimester fetal kick counting was   discussed at the visit today  No further appointment has been scheduled [facility]   [facility]  for Diaz Valadez at this time  Follow-up [facility] ultrasound   evaluation is recommended as clinically indicated     echocardiography is recommended as follow up to the  ultrasound   findings suggestive of an enlarged right atrium  The face to face time, in addition to time spent discussing ultrasound   results, was 10 minutes, greater than 50% of which was spent during   counseling and coordination of care  TORY Dutton M D     Maternal-Fetal Medicine   Electronically signed 16 11:14

## 2018-01-15 NOTE — MISCELLANEOUS
Reason For Visit  Reason For Visit Free Text Note Form: MET WITH PATIENT TODAY FOR FOLLOW UP  REPORTED IS DOING WELL  HAS NEEDED ITEMS FOR UNBORN BABY  DISCUSSED POST PARTUM DEPRESSION SIGNS AND SYMPTOMS  VERBALIZED UNDERSTANDING  PATIENT PLANS TO BREAST FEED AND HAS BREAST PUMP  DENIES ANY CONCERN AT THIS TIME  WILL CONTACT SW AS NEEDED  Active Problems    1  Bacterial vaginosis (616 10,041 9) (N76 0,A49 9)   2  H/O intrauterine growth restriction in prior pregnancy, currently pregnant (V23 49)   (O09 299)   3  History of intrauterine growth restriction in prior pregnancy, currently pregnant in third   trimester (V23 49) (O09 293)   4  Known or suspected fetal anomaly affecting antepartum care of mother (655 83)   (O35 9XX0)   5  Maternal obesity, antepartum (649 13,278 00) (O99 210,E66 9)   6  Obesity (278 00) (E66 9)   7  Pregnancy (V22 2) (Z33 1)   8  Single umbilical artery, maternal, antepartum (471 04) (O69 89X0)   9  Vulvovaginitis (616 10) (N76 0)    Current Meds   1  Breast Pump Miscellaneous; USE AS DIRECTED; Therapy: 60JBD7608 to (Last Rx:25Nov2015) Ordered   2  Prenatal Vitamins 28-0 8 MG Oral Tablet; TAKE 1 TABLET DAILY; Therapy: 43NRG4673 to (Misty Rhoades)  Requested for: 14SGT2294; Last   Rx:10Jun2015; Status: ACTIVE - Retrospective Authorization Ordered    Allergies    1  No Known Drug Allergies    2  No Known Environmental Allergies   3   No Known Food Allergies    Signatures   Electronically signed by : RIMA Lara; Jan 26 2016 11:07AM EST                       (Author)

## 2018-05-01 ENCOUNTER — HOSPITAL ENCOUNTER (EMERGENCY)
Facility: HOSPITAL | Age: 28
Discharge: HOME | End: 2018-05-02
Attending: EMERGENCY MEDICINE | Admitting: EMERGENCY MEDICINE
Payer: COMMERCIAL

## 2018-05-01 DIAGNOSIS — R00.2 PALPITATIONS: Primary | ICD-10-CM

## 2018-05-01 LAB
ALBUMIN SERPL BCP-MCNC: 3.6 G/DL (ref 3.5–5)
ALP SERPL-CCNC: 94 U/L (ref 46–116)
ALT SERPL W P-5'-P-CCNC: 20 U/L (ref 12–78)
AMPHETAMINES SERPL QL SCN: NEGATIVE
ANION GAP SERPL CALCULATED.3IONS-SCNC: 9 MMOL/L (ref 4–13)
AST SERPL W P-5'-P-CCNC: 15 U/L (ref 5–45)
BARBITURATES UR QL: NEGATIVE
BASOPHILS # BLD AUTO: 0.03 THOUSANDS/ΜL (ref 0–0.1)
BASOPHILS NFR BLD AUTO: 0 % (ref 0–1)
BENZODIAZ UR QL: NEGATIVE
BILIRUB SERPL-MCNC: 0.4 MG/DL (ref 0.2–1)
BUN SERPL-MCNC: 13 MG/DL (ref 5–25)
CALCIUM SERPL-MCNC: 8.8 MG/DL (ref 8.3–10.1)
CHLORIDE SERPL-SCNC: 102 MMOL/L (ref 100–108)
CO2 SERPL-SCNC: 29 MMOL/L (ref 21–32)
COCAINE UR QL: NEGATIVE
CREAT SERPL-MCNC: 0.93 MG/DL (ref 0.6–1.3)
EOSINOPHIL # BLD AUTO: 0.53 THOUSAND/ΜL (ref 0–0.61)
EOSINOPHIL NFR BLD AUTO: 4 % (ref 0–6)
ERYTHROCYTE [DISTWIDTH] IN BLOOD BY AUTOMATED COUNT: 13.3 % (ref 11.6–15.1)
EXT PREG TEST URINE: NEGATIVE
GFR SERPL CREATININE-BSD FRML MDRD: 86 ML/MIN/1.73SQ M
GLUCOSE SERPL-MCNC: 99 MG/DL (ref 65–140)
HCT VFR BLD AUTO: 43 % (ref 34.8–46.1)
HGB BLD-MCNC: 14.5 G/DL (ref 11.5–15.4)
LYMPHOCYTES # BLD AUTO: 3.57 THOUSANDS/ΜL (ref 0.6–4.47)
LYMPHOCYTES NFR BLD AUTO: 25 % (ref 14–44)
MCH RBC QN AUTO: 28.3 PG (ref 26.8–34.3)
MCHC RBC AUTO-ENTMCNC: 33.7 G/DL (ref 31.4–37.4)
MCV RBC AUTO: 84 FL (ref 82–98)
METHADONE UR QL: NEGATIVE
MONOCYTES # BLD AUTO: 1.22 THOUSAND/ΜL (ref 0.17–1.22)
MONOCYTES NFR BLD AUTO: 9 % (ref 4–12)
NEUTROPHILS # BLD AUTO: 8.73 THOUSANDS/ΜL (ref 1.85–7.62)
NEUTS SEG NFR BLD AUTO: 62 % (ref 43–75)
OPIATES UR QL SCN: NEGATIVE
PCP UR QL: NEGATIVE
PLATELET # BLD AUTO: 313 THOUSANDS/UL (ref 149–390)
PMV BLD AUTO: 9.5 FL (ref 8.9–12.7)
POTASSIUM SERPL-SCNC: 3.9 MMOL/L (ref 3.5–5.3)
PROT SERPL-MCNC: 7.7 G/DL (ref 6.4–8.2)
RBC # BLD AUTO: 5.13 MILLION/UL (ref 3.81–5.12)
SODIUM SERPL-SCNC: 140 MMOL/L (ref 136–145)
THC UR QL: NEGATIVE
TSH SERPL DL<=0.05 MIU/L-ACNC: 2.22 UIU/ML (ref 0.36–3.74)
WBC # BLD AUTO: 14.08 THOUSAND/UL (ref 4.31–10.16)

## 2018-05-01 PROCEDURE — 84443 ASSAY THYROID STIM HORMONE: CPT | Performed by: EMERGENCY MEDICINE

## 2018-05-01 PROCEDURE — 36415 COLL VENOUS BLD VENIPUNCTURE: CPT | Performed by: EMERGENCY MEDICINE

## 2018-05-01 PROCEDURE — 93005 ELECTROCARDIOGRAM TRACING: CPT

## 2018-05-01 PROCEDURE — 81025 URINE PREGNANCY TEST: CPT | Performed by: EMERGENCY MEDICINE

## 2018-05-01 PROCEDURE — 96360 HYDRATION IV INFUSION INIT: CPT

## 2018-05-01 PROCEDURE — 85025 COMPLETE CBC W/AUTO DIFF WBC: CPT | Performed by: EMERGENCY MEDICINE

## 2018-05-01 PROCEDURE — 80053 COMPREHEN METABOLIC PANEL: CPT | Performed by: EMERGENCY MEDICINE

## 2018-05-01 PROCEDURE — 96361 HYDRATE IV INFUSION ADD-ON: CPT

## 2018-05-01 PROCEDURE — 80307 DRUG TEST PRSMV CHEM ANLYZR: CPT | Performed by: EMERGENCY MEDICINE

## 2018-05-01 RX ADMIN — SODIUM CHLORIDE 1000 ML: 0.9 INJECTION, SOLUTION INTRAVENOUS at 22:24

## 2018-05-02 VITALS
DIASTOLIC BLOOD PRESSURE: 67 MMHG | SYSTOLIC BLOOD PRESSURE: 125 MMHG | WEIGHT: 159.83 LBS | RESPIRATION RATE: 18 BRPM | TEMPERATURE: 98.7 F | HEART RATE: 94 BPM | BODY MASS INDEX: 31.22 KG/M2 | OXYGEN SATURATION: 99 %

## 2018-05-02 LAB
ATRIAL RATE: 93 BPM
P AXIS: 59 DEGREES
PR INTERVAL: 140 MS
QRS AXIS: 86 DEGREES
QRSD INTERVAL: 78 MS
QT INTERVAL: 326 MS
QTC INTERVAL: 405 MS
T WAVE AXIS: 50 DEGREES
VENTRICULAR RATE: 93 BPM

## 2018-05-02 PROCEDURE — 99285 EMERGENCY DEPT VISIT HI MDM: CPT

## 2018-05-02 PROCEDURE — 93010 ELECTROCARDIOGRAM REPORT: CPT | Performed by: INTERNAL MEDICINE

## 2018-05-02 NOTE — DISCHARGE INSTRUCTIONS
Heart Palpitations   WHAT YOU NEED TO KNOW:   Heart palpitations are feelings that your heart races, jumps, throbs, or flutters  You may feel extra beats, no beats for a short time, or skipped beats  You may have these feelings in your chest, throat, or neck  They may happen when you are sitting, standing, or lying  Heart palpitations may be frightening, but are usually not caused by a serious problem  DISCHARGE INSTRUCTIONS:   Call 911 or have someone else call for any of the following:   · You have any of the following signs of a heart attack:      ¨ Squeezing, pressure, or pain in your chest that lasts longer than 5 minutes or returns    ¨ Discomfort or pain in your back, neck, jaw, stomach, or arm     ¨ Trouble breathing    ¨ Nausea or vomiting    ¨ Lightheadedness or a sudden cold sweat, especially with chest pain or trouble breathing    · You have any of the following signs of a stroke:      ¨ Numbness or drooping on one side of your face     ¨ Weakness in an arm or leg    ¨ Confusion or difficulty speaking    ¨ Dizziness, a severe headache, or vision loss    · You faint or lose consciousness  Return to the emergency department if:   · Your palpitations happen more often or get more intense  Contact your healthcare provider if:   · You have new or worsening swelling in your feet or ankles  · You have questions or concerns about your condition or care  Follow up with your healthcare provider as directed: You may need to follow up with a cardiologist  [place] may need tests to check for heart problems that cause palpitations  Write down your questions so you remember to ask them during your visits  Keep a record:  Write down when your palpitations start and stop, what you were doing when they started, and your symptoms  Keep track of what you ate or drank within a few hours of your palpitations  Include anything that seemed to help your symptoms, such as lying down or holding your breath   This record will help you and your healthcare provider learn what triggers your palpitations  Bring this record with you to your follow up visits  Help prevent heart palpitations:   · Manage stress and anxiety  Find ways to relax such as listening to music, meditating, or doing yoga  Exercise can also help decrease stress and anxiety  Talk to someone you trust about your stress or anxiety  You can also talk to a therapist      · Get plenty of sleep every night  Ask your healthcare provider how much sleep you need each night  · Do not drink caffeine or alcohol  Caffeine and alcohol can make your palpitations worse  Caffeine is found in soda, coffee, tea, chocolate, and drinks that increase your energy  · Do not smoke  Nicotine and other chemicals in cigarettes and cigars may damage your heart and blood vessels  Ask your healthcare provider for information if you currently smoke and need help to quit  E-cigarettes or smokeless tobacco still contain nicotine  Talk to your healthcare provider before you use these products  · Do not use illegal drugs  Talk to your healthcare provider if you use illegal drugs and want help to quit  © 2017 [facility] Information is for End User's use only and may not be sold, redistributed or otherwise used for commercial purposes  All illustrations and images included in CareNotes® are the copyrighted property of A D [employer] M [employer]  or [employer]  The above information is an  only  It is not intended as medical advice for individual conditions or treatments  Talk to your doctor, nurse or pharmacist before following any medical regimen to see if it is safe and effective for you

## 2018-05-02 NOTE — ED PROVIDER NOTES
History  Chief Complaint   Patient presents with    Palpitations     PT reports "My heart has been racing for a few weeks now, even when I am laying down" Pt denies any other symptoms       This 42-year-old female presents today with palpitations described as a fast pounding heart rate  Patient states she has been having episodes of this in the course of the last week, getting progressively worse  Patient states episodes or self-limited, usually lasting approximately 10-30 minutes  Patient knows of no exacerbating or relieving factors  Patient denies any shortness of breath, diaphoresis, vomiting associated with this  Patient denies any recent change in any medications or stressful life events  Patient states her last period was approximately one month ago and was unremarkable  History provided by:  Patient   used: No    Palpitations   Palpitations quality:  Fast  Onset quality:  Sudden  Duration:  20 minutes  Timing:  Sporadic  Progression:  Waxing and waning  Chronicity:  New  Context: not appetite suppressants, not caffeine, not hyperventilation, not illicit drugs, not nicotine and not stimulant use    Relieved by:  None tried  Worsened by:  Nothing  Ineffective treatments:  None tried  Associated symptoms: no back pain, no chest pain, no chest pressure, no cough, no diaphoresis, no dizziness, no nausea, no shortness of breath, no vomiting and no weakness        Prior to Admission Medications   Prescriptions Last Dose Informant Patient Reported? Taking?   famotidine (PEPCID) 20 mg tablet   No No   Sig: Take 1 tablet by mouth 2 (two) times a day   sucralfate (CARAFATE) 1 g/10 mL suspension   No No   Sig: Take 10 mL by mouth 4 (four) times a day      Facility-Administered Medications: None       History reviewed  No pertinent past medical history  History reviewed  No pertinent surgical history      Family History   Problem Relation Age of Onset    Asthma Mother     Stroke Mother     No Known Problems Father     No Known Problems Sister     No Known Problems Brother     No Known Problems Daughter     No Known Problems Son     No Known Problems Maternal Aunt     No Known Problems Maternal Uncle     No Known Problems Paternal Aunt     No Known Problems Paternal Uncle     No Known Problems Maternal Grandmother     Hypertension Maternal Grandfather     No Known Problems Paternal Grandmother     No Known Problems Paternal Grandfather      I have reviewed and agree with the history as documented  Social History   Substance Use Topics    Smoking status: Never Smoker    Smokeless tobacco: Never Used    Alcohol use No        Review of Systems   Constitutional: Negative for activity change, appetite change, diaphoresis and fever  HENT: Negative for ear pain, facial swelling, sore throat, tinnitus and voice change  Eyes: Negative for photophobia, pain and redness  Respiratory: Negative for cough, chest tightness, shortness of breath and wheezing  Cardiovascular: Positive for palpitations  Negative for chest pain and leg swelling  Gastrointestinal: Negative for abdominal distention, abdominal pain, constipation, diarrhea, nausea and vomiting  Genitourinary: Negative for difficulty urinating, dysuria, flank pain, hematuria and urgency  Musculoskeletal: Negative for back pain, gait problem and neck pain  Skin: Negative for rash and wound  Neurological: Negative for dizziness, syncope, speech difficulty, weakness and headaches  Psychiatric/Behavioral: Negative for agitation, behavioral problems and confusion         Physical Exam  ED Triage Vitals [05/01/18 2054]   Temperature Pulse Respirations Blood Pressure SpO2   98 7 °F (37 1 °C) (!) 118 18 143/82 100 %      Temp Source Heart Rate Source Patient Position - Orthostatic VS BP Location FiO2 (%)   Oral Monitor Lying Right arm --      Pain Score       --           Orthostatic Vital Signs  Vitals:    05/01/18 2054 05/01/18 2237 05/01/18 2330   BP: 143/82 124/72 130/71   Pulse: (!) 118 97 100   Patient Position - Orthostatic VS: Lying  Sitting       Physical Exam   Constitutional: She is oriented to person, place, and time  She appears well-developed and well-nourished  She is cooperative  No distress  HENT:   Head: Normocephalic and atraumatic  Mouth/Throat: Oropharynx is clear and moist    Eyes: EOM and lids are normal  Pupils are equal, round, and reactive to light  Right eye exhibits no discharge  Left eye exhibits no discharge  Right conjunctiva is not injected  Left conjunctiva is not injected  Neck: Trachea normal, normal range of motion, full passive range of motion without pain and phonation normal  Neck supple  Cardiovascular: Regular rhythm, normal heart sounds and normal pulses  Tachycardia present  No murmur heard  Pulses:       Dorsalis pedis pulses are 2+ on the right side, and 2+ on the left side  Pulmonary/Chest: Effort normal and breath sounds normal    Abdominal: Soft  She exhibits no distension  There is no tenderness  Musculoskeletal: Normal range of motion  She exhibits no edema  Neurological: She is alert and oriented to person, place, and time  She has normal strength  No cranial nerve deficit  GCS eye subscore is 4  GCS verbal subscore is 5  GCS motor subscore is 6  Skin: Skin is warm, dry and intact  Capillary refill takes less than 2 seconds  No rash noted  Psychiatric: She has a normal mood and affect  Her speech is normal and behavior is normal    Vitals reviewed        ED Medications  Medications   sodium chloride 0 9 % bolus 1,000 mL (1,000 mL Intravenous New Bag 5/1/18 2224)       Diagnostic Studies  Results Reviewed     Procedure Component Value Units Date/Time    TSH [[id number]]  (Normal) Collected:  05/01/18 2224    Lab Status:  Final result Specimen:  Blood from Arm, Right Updated:  05/01/18 2255     TSH 3RD GENERATON 2 217 uIU/mL     Narrative:         Patients undergoing fluorescein dye angiography may retain small amounts of fluorescein in the body for 48-72 hours post procedure  Samples containing fluorescein can produce falsely depressed TSH values  If the patient had this procedure,a specimen should be resubmitted post fluorescein clearance  The recommended reference ranges for TSH during pregnancy are as follows:  First trimester 0 1 to 2 5 uIU/mL  Second trimester  0 2 to 3 0 uIU/mL  Third trimester 0 3 to 3 0 uIU/m      Comprehensive metabolic panel [[id number]] Collected:  05/01/18 2224    Lab Status:  Final result Specimen:  Blood from Arm, Right Updated:  05/01/18 2246     Sodium 140 mmol/L      Potassium 3 9 mmol/L      Chloride 102 mmol/L      CO2 29 mmol/L      Anion Gap 9 mmol/L      BUN 13 mg/dL      Creatinine 0 93 mg/dL      Glucose 99 mg/dL      Calcium 8 8 mg/dL      AST 15 U/L      ALT 20 U/L      Alkaline Phosphatase 94 U/L      Total Protein 7 7 g/dL      Albumin 3 6 g/dL      Total Bilirubin 0 40 mg/dL      eGFR 86 ml/min/1 73sq m     Narrative:         National Kidney Disease Education Program recommendations are as follows:  GFR calculation is accurate only with a steady state creatinine  Chronic Kidney disease less than 60 ml/min/1 73 sq  meters  Kidney failure less than 15 ml/min/1 73 sq  meters  Rapid drug screen, urine [[id number]]  (Normal) Collected:  05/01/18 2214    Lab Status:  Final result Specimen:  Urine from Urine, Clean Catch Updated:  05/01/18 2233     Amph/Meth UR Negative     Barbiturate Ur Negative     Benzodiazepine Urine Negative     Cocaine Urine Negative     Methadone Urine Negative     Opiate Urine Negative     PCP Ur Negative     THC Urine Negative    Narrative:         FOR MEDICAL PURPOSES ONLY  IF CONFIRMATION NEEDED PLEASE CONTACT THE LAB WITHIN 5 DAYS      Drug Screen Cutoff Levels:  AMPHETAMINE/METHAMPHETAMINES  1000 ng/mL  BARBITURATES     200 ng/mL  BENZODIAZEPINES     200 ng/mL  COCAINE      300 ng/mL  METHADONE      300 ng/mL  OPIATES      300 ng/mL  PHENCYCLIDINE     25 ng/mL  THC       50 ng/mL    CBC and differential [[id number]]  (Abnormal) Collected:  05/01/18 2224    Lab Status:  Final result Specimen:  Blood from Arm, Right Updated:  05/01/18 2231     WBC 14 08 (H) Thousand/uL      RBC 5 13 (H) Million/uL      Hemoglobin 14 5 g/dL      Hematocrit 43 0 %      MCV 84 fL      MCH 28 3 pg      MCHC 33 7 g/dL      RDW 13 3 %      MPV 9 5 fL      Platelets 970 Thousands/uL      Neutrophils Relative 62 %      Lymphocytes Relative 25 %      Monocytes Relative 9 %      Eosinophils Relative 4 %      Basophils Relative 0 %      Neutrophils Absolute 8 73 (H) Thousands/µL      Lymphocytes Absolute 3 57 Thousands/µL      Monocytes Absolute 1 22 Thousand/µL      Eosinophils Absolute 0 53 Thousand/µL      Basophils Absolute 0 03 Thousands/µL     POCT pregnancy, urine [[id number]]  (Normal) Resulted:  05/01/18 2225    Lab Status:  Final result Updated:  05/01/18 2225     EXT PREG TEST UR (Ref: Negative) negative                 No orders to display              Procedures  ECG 12 Lead Documentation  Date/Time: 5/1/2018 9:39 PM  Performed by: Zahraa Murphy by: Dany Hernandez     Indications / Diagnosis:   palpitations  ECG reviewed by me, the ED Provider: yes    Patient location:  ED  Previous ECG:     Previous ECG:  Compared to current    Comparison ECG info:   9/5/2017    Similarity:  No change  Interpretation:     Interpretation: normal    Rate:     ECG rate:  93    ECG rate assessment: normal    Rhythm:     Rhythm: sinus rhythm    Ectopy:     Ectopy: none    QRS:     QRS axis:  Normal    QRS intervals:  Normal  Conduction:     Conduction: normal    ST segments:     ST segments:  Normal  T waves:     T waves: normal             Phone Contacts  ED Phone Contact    ED Course  ED Course as of May 01 2356   Tue May 01, 2018   2326   Patient's workup unremarkable, heart rate has improved    Patient will be discharged home  MDM  Number of Diagnoses or Management Options  Diagnosis management comments: On my initial evaluation patient's heart rate was approximately 114 beats per minute  However patient's heart rate spontaneously has gone down to the 90s  Amount and/or Complexity of Data Reviewed  Clinical lab tests: ordered and reviewed  Tests in the medicine section of CPT®: ordered and reviewed  Independent visualization of images, tracings, or specimens: yes    Risk of Complications, Morbidity, and/or Mortality  Presenting problems: high  Diagnostic procedures: moderate  Management options: moderate    Patient Progress  Patient progress: stable    CritCare Time    Disposition  Final diagnoses:   Palpitations     Time reflects when diagnosis was documented in both MDM as applicable and the Disposition within this note     Time User Action Codes Description Comment    5/1/2018 11:55 PM Cristel Castañeda Add [R00 2] Palpitations       ED Disposition     ED Disposition Condition Comment    Discharge  Jayde Alvarez discharge to home/self care  Condition at discharge: Stable        Follow-up Information     Follow up With Specialties Details Why Andie [address]  Call in 2 days  for further evaluation of your palpitations should they continue [phone]          Patient's Medications   Discharge Prescriptions    No medications on file     No discharge procedures on file      ED Provider  Electronically Signed by           Bassam Burden MD  05/01/18 7442

## 2018-10-19 ENCOUNTER — HOSPITAL ENCOUNTER (EMERGENCY)
Facility: HOSPITAL | Age: 28
Discharge: HOME | End: 2018-10-19
Attending: EMERGENCY MEDICINE
Payer: COMMERCIAL

## 2018-10-19 VITALS
DIASTOLIC BLOOD PRESSURE: 77 MMHG | WEIGHT: 160 LBS | TEMPERATURE: 97.9 F | SYSTOLIC BLOOD PRESSURE: 141 MMHG | RESPIRATION RATE: 20 BRPM | OXYGEN SATURATION: 98 % | BODY MASS INDEX: 31.41 KG/M2 | HEART RATE: 78 BPM | HEIGHT: 60 IN

## 2018-10-19 DIAGNOSIS — R10.13 EPIGASTRIC PAIN: Primary | ICD-10-CM

## 2018-10-19 DIAGNOSIS — K80.20 CHOLELITHIASIS: ICD-10-CM

## 2018-10-19 LAB
ALBUMIN SERPL BCP-MCNC: 3.9 G/DL (ref 3.5–5)
ALP SERPL-CCNC: 88 U/L (ref 46–116)
ALT SERPL W P-5'-P-CCNC: 16 U/L (ref 12–78)
ANION GAP SERPL CALCULATED.3IONS-SCNC: 7 MMOL/L (ref 4–13)
AST SERPL W P-5'-P-CCNC: 13 U/L (ref 5–45)
BACTERIA UR QL AUTO: ABNORMAL /HPF
BASOPHILS # BLD AUTO: 0.04 THOUSANDS/ΜL (ref 0–0.1)
BASOPHILS NFR BLD AUTO: 0 % (ref 0–1)
BILIRUB SERPL-MCNC: 0.39 MG/DL (ref 0.2–1)
BILIRUB UR QL STRIP: NEGATIVE
BUN SERPL-MCNC: 7 MG/DL (ref 5–25)
CALCIUM SERPL-MCNC: 9.3 MG/DL (ref 8.3–10.1)
CHLORIDE SERPL-SCNC: 104 MMOL/L (ref 100–108)
CLARITY UR: CLEAR
CO2 SERPL-SCNC: 27 MMOL/L (ref 21–32)
COLOR UR: YELLOW
CREAT SERPL-MCNC: 0.74 MG/DL (ref 0.6–1.3)
EOSINOPHIL # BLD AUTO: 0.3 THOUSAND/ΜL (ref 0–0.61)
EOSINOPHIL NFR BLD AUTO: 3 % (ref 0–6)
ERYTHROCYTE [DISTWIDTH] IN BLOOD BY AUTOMATED COUNT: 12.7 % (ref 11.6–15.1)
EXT PREG TEST URINE: NORMAL
GFR SERPL CREATININE-BSD FRML MDRD: 112 ML/MIN/1.73SQ M
GLUCOSE SERPL-MCNC: 116 MG/DL (ref 65–140)
GLUCOSE UR STRIP-MCNC: NEGATIVE MG/DL
HCT VFR BLD AUTO: 45.7 % (ref 34.8–46.1)
HGB BLD-MCNC: 15.1 G/DL (ref 11.5–15.4)
HGB UR QL STRIP.AUTO: ABNORMAL
HYALINE CASTS #/AREA URNS LPF: ABNORMAL /LPF
IMM GRANULOCYTES # BLD AUTO: 0.03 THOUSAND/UL (ref 0–0.2)
IMM GRANULOCYTES NFR BLD AUTO: 0 % (ref 0–2)
KETONES UR STRIP-MCNC: ABNORMAL MG/DL
LEUKOCYTE ESTERASE UR QL STRIP: ABNORMAL
LIPASE SERPL-CCNC: 92 U/L (ref 73–393)
LYMPHOCYTES # BLD AUTO: 2.06 THOUSANDS/ΜL (ref 0.6–4.47)
LYMPHOCYTES NFR BLD AUTO: 19 % (ref 14–44)
MCH RBC QN AUTO: 28.5 PG (ref 26.8–34.3)
MCHC RBC AUTO-ENTMCNC: 33 G/DL (ref 31.4–37.4)
MCV RBC AUTO: 86 FL (ref 82–98)
MONOCYTES # BLD AUTO: 0.62 THOUSAND/ΜL (ref 0.17–1.22)
MONOCYTES NFR BLD AUTO: 6 % (ref 4–12)
NEUTROPHILS # BLD AUTO: 7.61 THOUSANDS/ΜL (ref 1.85–7.62)
NEUTS SEG NFR BLD AUTO: 72 % (ref 43–75)
NITRITE UR QL STRIP: NEGATIVE
NON-SQ EPI CELLS URNS QL MICRO: ABNORMAL /HPF
NRBC BLD AUTO-RTO: 0 /100 WBCS
PH UR STRIP.AUTO: 6 [PH] (ref 4.5–8)
PLATELET # BLD AUTO: 293 THOUSANDS/UL (ref 149–390)
PMV BLD AUTO: 9.2 FL (ref 8.9–12.7)
POTASSIUM SERPL-SCNC: 3.8 MMOL/L (ref 3.5–5.3)
PROT SERPL-MCNC: 8.4 G/DL (ref 6.4–8.2)
PROT UR STRIP-MCNC: ABNORMAL MG/DL
RBC # BLD AUTO: 5.3 MILLION/UL (ref 3.81–5.12)
RBC #/AREA URNS AUTO: ABNORMAL /HPF
SODIUM SERPL-SCNC: 138 MMOL/L (ref 136–145)
SP GR UR STRIP.AUTO: 1.02 (ref 1–1.03)
UROBILINOGEN UR QL STRIP.AUTO: 0.2 E.U./DL
WBC # BLD AUTO: 10.66 THOUSAND/UL (ref 4.31–10.16)
WBC #/AREA URNS AUTO: ABNORMAL /HPF

## 2018-10-19 PROCEDURE — 36415 COLL VENOUS BLD VENIPUNCTURE: CPT | Performed by: EMERGENCY MEDICINE

## 2018-10-19 PROCEDURE — 81001 URINALYSIS AUTO W/SCOPE: CPT

## 2018-10-19 PROCEDURE — 80053 COMPREHEN METABOLIC PANEL: CPT | Performed by: EMERGENCY MEDICINE

## 2018-10-19 PROCEDURE — 96361 HYDRATE IV INFUSION ADD-ON: CPT

## 2018-10-19 PROCEDURE — 81025 URINE PREGNANCY TEST: CPT | Performed by: EMERGENCY MEDICINE

## 2018-10-19 PROCEDURE — 83690 ASSAY OF LIPASE: CPT | Performed by: EMERGENCY MEDICINE

## 2018-10-19 PROCEDURE — 85025 COMPLETE CBC W/AUTO DIFF WBC: CPT | Performed by: EMERGENCY MEDICINE

## 2018-10-19 PROCEDURE — 96360 HYDRATION IV INFUSION INIT: CPT

## 2018-10-19 PROCEDURE — 99284 EMERGENCY DEPT VISIT MOD MDM: CPT

## 2018-10-19 RX ORDER — FAMOTIDINE 20 MG/1
20 TABLET, FILM COATED ORAL 2 TIMES DAILY
Qty: 30 TABLET | Refills: 0 | Status: SHIPPED | OUTPATIENT
Start: 2018-10-19

## 2018-10-19 RX ORDER — FAMOTIDINE 20 MG/1
20 TABLET, FILM COATED ORAL ONCE
Status: COMPLETED | OUTPATIENT
Start: 2018-10-19 | End: 2018-10-19

## 2018-10-19 RX ORDER — MAGNESIUM HYDROXIDE/ALUMINUM HYDROXICE/SIMETHICONE 120; 1200; 1200 MG/30ML; MG/30ML; MG/30ML
30 SUSPENSION ORAL ONCE
Status: COMPLETED | OUTPATIENT
Start: 2018-10-19 | End: 2018-10-19

## 2018-10-19 RX ADMIN — ALUMINUM HYDROXIDE, MAGNESIUM HYDROXIDE, AND SIMETHICONE 30 ML: 200; 200; 20 SUSPENSION ORAL at 14:19

## 2018-10-19 RX ADMIN — SODIUM CHLORIDE 1000 ML: 0.9 INJECTION, SOLUTION INTRAVENOUS at 11:54

## 2018-10-19 RX ADMIN — LIDOCAINE HYDROCHLORIDE 15 ML: 20 SOLUTION ORAL; TOPICAL at 14:19

## 2018-10-19 RX ADMIN — FAMOTIDINE 20 MG: 20 TABLET ORAL at 14:18

## 2018-10-19 NOTE — ED ATTENDING ATTESTATION
Dieter Kwong MD, saw and evaluated the patient  All available labs and X-rays were ordered by me or the resident and have been reviewed by myself  I discussed the patient with the resident / non-physician and agree with the resident's / non-physician practitioner's findings and plan as documented in the resident's / non-physician practicitioner's note, except where noted  At this point, I agree with the current assessment done in the ED  Chief Complaint   Patient presents with    Abdominal Pain     Pt c/o upper abdominal pain and bilateral flank pain for past few days  33 y/o F:  - this is a 28-year-old female presenting for evaluation of diffuse intermittent upper and right upper quadrant belly pain  The patient states that for the past few days she has been having this pain coming and going, sometimes related to food intake, sometimes not  It is completely pain-free at the moment  She can't think of anything that makes it worse or better  Lying flat sometimes seems to relieve it  Eating food sometimes makes her epigastric pain a little bit worse but not severely  Denies any fevers chills night sweats chest pain shortness of breath  She did have similar when she was pregnant and was diagnosed with biliary colic  She was actually supposed to have her gallbladder taken out at some point after her pregnancy but never actually needed the procedure done  PMH:  - as above  PSH:  - None  Denies smoking, drinking, drugs  PE:  Vitals:    10/19/18 1041 10/19/18 1245 10/19/18 1415 10/19/18 1445   BP: (!) 171/79 141/94 155/85 141/77   BP Location: Left arm Left arm Right arm    Pulse: 87 90 88 78   Resp: 17 17 20    Temp: 97 9 °F (36 6 °C)      TempSrc: Tympanic      SpO2: 99% 100% 99% 98%   Weight: 72 6 kg (160 lb)      Height: 5' (1 524 m)      General: VSS, NAD, awake, alert  Well-nourished, well-developed  Appears stated age  Speaking normally in full sentences     Head: Normocephalic, atraumatic, nontender  Eyes: PERRL, EOM-I  No diplopia  No hyphema  No subconjunctival hemorrhages  Symmetrical lids  ENT: Atraumatic external nose and ears  MMM  No malocclusion  No stridor  Normal phonation  No drooling  Normal swallowing  Neck: Symmetric, trachea midline  No JVD  CV: RRR  +S1/S2  No murmurs or gallops  Peripheral pulses +2 throughout  No chest wall tenderness  Lungs:   Unlabored No retractions  CTAB, lungs sounds equal bilateral    No tachypnea  Abd: +BS, soft,   Epigastric tenderness  No jj's  No CVAT  No rashes  ND   Heel strike negative  MSK:   FROM   Back:   No rashes  Skin: Dry, intact  Neuro: AAOx3, GCS 15, CN II-XII grossly intact  Motor grossly intact  Psychiatric/Behavioral: Appropriate mood and affect   Exam: deferred  A:  - Belly pain  P:  - bedside U/S with stones  - discussed labs, [facility] evaluation? Doubtful  Likely pure biliary colic   - f/u surgery if negative labs  - 13 point ROS was performed and all are normal unless stated in the history above  - Nursing note reviewed  Vitals reviewed  - Orders placed by myself and/or advanced practitioner / resident     - Previous chart was reviewed  - No language barrier    - History obtained from patient  - There are no limitations to the history obtained  - Critical care time: Not applicable for this patient  Final Diagnosis:  1  Epigastric pain    2   Cholelithiasis         Medications   sodium chloride 0 9 % bolus 1,000 mL (0 mL Intravenous Stopped 10/19/18 1420)   lidocaine viscous (XYLOCAINE) 2 % mucosal solution 15 mL (15 mL Swish & Spit Given 10/19/18 1419)   aluminum-magnesium hydroxide-simethicone (MYLANTA) 200-200-20 mg/5 mL oral suspension 30 mL (30 mL Oral Given 10/19/18 1419)   famotidine (PEPCID) tablet 20 mg (20 mg Oral Given 10/19/18 1418)     No orders to display     Orders Placed This Encounter   Procedures    CBC and differential    Comprehensive metabolic panel    Lipase    Urine Microscopic    POCT pregnancy, urine    POCT urinalysis dipstick     Labs Reviewed   CBC AND DIFFERENTIAL - Abnormal        Result Value Ref Range Status    WBC 10 66 (*) 4 31 - 10 16 Thousand/uL Final    RBC 5 30 (*) 3 81 - 5 12 Million/uL Final    Hemoglobin 15 1  11 5 - 15 4 g/dL Final    Hematocrit 45 7  34 8 - 46 1 % Final    MCV 86  82 - 98 fL Final    MCH 28 5  26 8 - 34 3 pg Final    MCHC 33 0  31 4 - 37 4 g/dL Final    RDW 12 7  11 6 - 15 1 % Final    MPV 9 2  8 9 - 12 7 fL Final    Platelets 782  586 - 390 Thousands/uL Final    nRBC 0  /100 WBCs Final    Neutrophils Relative 72  43 - 75 % Final    Immat GRANS % 0  0 - 2 % Final    Lymphocytes Relative 19  14 - 44 % Final    Monocytes Relative 6  4 - 12 % Final    Eosinophils Relative 3  0 - 6 % Final    Basophils Relative 0  0 - 1 % Final    Neutrophils Absolute 7 61  1 85 - 7 62 Thousands/µL Final    Immature Grans Absolute 0 03  0 00 - 0 20 Thousand/uL Final    Lymphocytes Absolute 2 06  0 60 - 4 47 Thousands/µL Final    Monocytes Absolute 0 62  0 17 - 1 22 Thousand/µL Final    Eosinophils Absolute 0 30  0 00 - 0 61 Thousand/µL Final    Basophils Absolute 0 04  0 00 - 0 10 Thousands/µL Final   COMPREHENSIVE METABOLIC PANEL - Abnormal     Sodium 138  136 - 145 mmol/L Final    Potassium 3 8  3 5 - 5 3 mmol/L Final    Chloride 104  100 - 108 mmol/L Final    CO2 27  21 - 32 mmol/L Final    ANION GAP 7  4 - 13 mmol/L Final    BUN 7  5 - 25 mg/dL Final    Creatinine 0 74  0 60 - 1 30 mg/dL Final    Comment: Standardized to IDMS reference method    Glucose 116  65 - 140 mg/dL Final    Comment:   If the patient is fasting, the ADA then defines impaired fasting glucose as > 100 mg/dL and diabetes as > or equal to 123 mg/dL  Specimen collection should occur prior to Sulfasalazine administration due to the potential for falsely depressed results   Specimen collection should occur prior to Sulfapyridine administration due to the potential for falsely elevated results  Calcium 9 3  8 3 - 10 1 mg/dL Final    AST 13  5 - 45 U/L Final    Comment:   Specimen collection should occur prior to Sulfasalazine administration due to the potential for falsely depressed results  ALT 16  12 - 78 U/L Final    Comment:   Specimen collection should occur prior to Sulfasalazine and/or Sulfapyridine administration due to the potential for falsely depressed results  Alkaline Phosphatase 88  46 - 116 U/L Final    Total Protein 8 4 (*) 6 4 - 8 2 g/dL Final    Albumin 3 9  3 5 - 5 0 g/dL Final    Total Bilirubin 0 39  0 20 - 1 00 mg/dL Final    eGFR 112  ml/min/1 73sq m Final    Narrative:     National Kidney Disease Education Program recommendations are as follows:  GFR calculation is accurate only with a steady state creatinine  Chronic Kidney disease less than 60 ml/min/1 73 sq  meters  Kidney failure less than 15 ml/min/1 73 sq  meters     URINE MICROSCOPIC - Abnormal     RBC, UA 2-4 (*) None Seen, 0-5 /hpf Final    WBC, UA 2-4 (*) None Seen, 0-5, 5-55, 5-65 /hpf Final    Epithelial Cells None Seen  None Seen, Occasional /hpf Final    Bacteria, UA Occasional  None Seen, Occasional /hpf Final    Hyaline Casts, UA None Seen  None Seen /lpf Final   ED URINE MACROSCOPIC - Abnormal     Color, UA Yellow   Final    Clarity, UA Clear   Final    pH, UA 6 0  4 5 - 8 0 Final    Leukocytes, UA Trace (*) Negative Final    Nitrite, UA Negative  Negative Final    Protein, UA 30 (1+) (*) Negative mg/dl Final    Glucose, UA Negative  Negative mg/dl Final    Ketones, UA 15 (1+) (*) Negative mg/dl Final    Urobilinogen, UA 0 2  0 2, 1 0 E U /dl E U /dl Final    Bilirubin, UA Negative  Negative Final    Blood, UA Small (*) Negative Final    Specific Gravity, UA 1 025  1 003 - 1 030 Final    Narrative:     CLINITEK RESULT   LIPASE - Normal    Lipase 92  73 - 393 u/L Final   POCT PREGNANCY, URINE - Normal    EXT PREG TEST UR (Ref: Negative) neg   Final   POCT URINALYSIS DIPSTICK - Normal     Time reflects when diagnosis was documented in both MDM as applicable and the Disposition within this note     Time User Action Codes Description Comment    10/19/2018  2:15 PM Ajay Artist Add [R10 13] Epigastric pain     10/19/2018  2:32 PM Aguilar, [address] [facility] [K80 20] Cholelithiasis       ED Disposition     ED Disposition Condition Comment    Discharge  Juanita Cuba discharge to home/self care  Condition at discharge: Stable        Follow-up Information     Follow up With Specialties Details Why [address] [facility] Emergency Department Emergency Medicine   [address]  [phone]  ED, [address] [place] [address], [facility]   Gastroenterology [address]  [place] [id number] [address] [facility]  Cholelithiasis [address] [id number]  [address] [place] [address], [address] [place] [address], [phone]        Discharge Medication List as of 10/19/2018  2:32 PM      START taking these medications    Details   famotidine (PEPCID) 20 mg tablet Take 1 tablet (20 mg total) by mouth 2 (two) times a day, Starting Fri 10/19/2018, Print           No discharge procedures on file  None       Portions of the record may have been created with voice recognition software  Occasional wrong word or "sound a like" substitutions may have occurred due to the inherent limitations of voice recognition software  Read the chart carefully and recognize, using context, where substitutions have occurred      Electronically signed by:  Adiel Galvan

## 2018-10-19 NOTE — ED PROVIDER NOTES
History  Chief Complaint   Patient presents with    Abdominal Pain     Pt c/o upper abdominal pain and bilateral flank pain for past few days  32-year-old woman presents for evaluation of abdominal pain and bilateral flank pain  The symptoms started 3 days ago  She 1st noticed bilateral flank pain  She states that the pain feels like it is in her kidneys  Yesterday she noticed epigastric pain that radiated somewhat to her right upper quadrant  She has been taking Tylenol every 4 hours with no relief of pain  The pain is intermittent and waxing and waning in severity  She denies dysuria, hematuria, nausea, vomiting, diarrhea, shortness of breath, chest pain  Last menstrual period was 1 month ago  She denies vaginal discharge, bleeding she has a history of gastritis and is not currently on in the and has medication  She also has a history of gallstones  She still has her gallbladder  She was diagnosed with this while pregnant 3 years ago  It was managed medically and she improved  None       History reviewed  No pertinent past medical history  History reviewed  No pertinent surgical history  Family History   Problem Relation Age of Onset    Asthma Mother     Stroke Mother     No Known Problems Father     No Known Problems Sister     No Known Problems Brother     No Known Problems Daughter     No Known Problems Son     No Known Problems Maternal Aunt     No Known Problems Maternal Uncle     No Known Problems Paternal Aunt     No Known Problems Paternal Uncle     No Known Problems Maternal Grandmother     Hypertension Maternal Grandfather     No Known Problems Paternal Grandmother     No Known Problems Paternal Grandfather      I have reviewed and agree with the history as documented      Social History   Substance Use Topics    Smoking status: Never Smoker    Smokeless tobacco: Never Used    Alcohol use No        Review of Systems   Constitutional: Negative for appetite change, chills, diaphoresis, fatigue and fever  HENT: Negative for congestion, rhinorrhea and sore throat  Respiratory: Negative for cough, shortness of breath, wheezing and stridor  Cardiovascular: Negative for chest pain, palpitations and leg swelling  Gastrointestinal: Positive for abdominal pain  Negative for abdominal distention, constipation, diarrhea, nausea and vomiting  Endocrine: Negative for polydipsia and polyuria  Genitourinary: Positive for flank pain (b/l)  Negative for dysuria, hematuria, vaginal bleeding, vaginal discharge and vaginal pain  Musculoskeletal: Negative for back pain, neck pain and neck stiffness  Skin: Negative for pallor, rash and wound  Neurological: Negative for dizziness, light-headedness and headaches  Psychiatric/Behavioral: Negative for behavioral problems and confusion  All other systems reviewed and are negative  Physical Exam  ED Triage Vitals [10/19/18 1041]   Temperature Pulse Respirations Blood Pressure SpO2   97 9 °F (36 6 °C) 87 17 (!) 171/79 99 %      Temp Source Heart Rate Source Patient Position - Orthostatic VS BP Location FiO2 (%)   Tympanic Monitor Sitting Left arm --      Pain Score       7           Orthostatic Vital Signs  Vitals:    10/19/18 1041 10/19/18 1245 10/19/18 1415 10/19/18 1445   BP: (!) 171/79 141/94 155/85 141/77   Pulse: 87 90 88 78   Patient Position - Orthostatic VS: Sitting Sitting Sitting        Physical Exam   Constitutional: She is oriented to person, place, and time  She appears well-developed and well-nourished  No distress  HENT:   Head: Normocephalic and atraumatic  Eyes: Conjunctivae are normal  No scleral icterus  Neck: Normal range of motion  Neck supple  Cardiovascular: Normal rate, regular rhythm, normal heart sounds and intact distal pulses  Exam reveals no gallop and no friction rub  No murmur heard  Pulmonary/Chest: Effort normal and breath sounds normal  No respiratory distress  She has no wheezes  She has no rales  She exhibits no tenderness  Abdominal: Soft  Bowel sounds are normal  She exhibits no distension and no mass  There is tenderness (Epigastric and right upper quadrant tenderness to palpation)  There is no rebound, no guarding and no CVA tenderness  Musculoskeletal: Normal range of motion  She exhibits no edema, tenderness or deformity  Neurological: She is alert and oriented to person, place, and time  Skin: Skin is warm and dry  Capillary refill takes less than 2 seconds  No rash noted  She is not diaphoretic  No erythema  No pallor  Psychiatric: She has a normal mood and affect  Her behavior is normal    Nursing note and vitals reviewed        ED Medications  Medications   sodium chloride 0 9 % bolus 1,000 mL (0 mL Intravenous Stopped 10/19/18 1420)   lidocaine viscous (XYLOCAINE) 2 % mucosal solution 15 mL (15 mL Swish & Spit Given 10/19/18 1419)   aluminum-magnesium hydroxide-simethicone (MYLANTA) 200-200-20 mg/5 mL oral suspension 30 mL (30 mL Oral Given 10/19/18 1419)   famotidine (PEPCID) tablet 20 mg (20 mg Oral Given 10/19/18 1418)       Diagnostic Studies  Results Reviewed     Procedure Component Value Units Date/Time    Comprehensive metabolic panel [[id number]]  (Abnormal) Collected:  10/19/18 1147    Lab Status:  Final result Specimen:  Blood from Arm, Left Updated:  10/19/18 1222     Sodium 138 mmol/L      Potassium 3 8 mmol/L      Chloride 104 mmol/L      CO2 27 mmol/L      ANION GAP 7 mmol/L      BUN 7 mg/dL      Creatinine 0 74 mg/dL      Glucose 116 mg/dL      Calcium 9 3 mg/dL      AST 13 U/L      ALT 16 U/L      Alkaline Phosphatase 88 U/L      Total Protein 8 4 (H) g/dL      Albumin 3 9 g/dL      Total Bilirubin 0 39 mg/dL      eGFR 112 ml/min/1 73sq m     Narrative:         National Kidney Disease Education Program recommendations are as follows:  GFR calculation is accurate only with a steady state creatinine  Chronic Kidney disease less than 60 ml/min/1 73 sq  meters  Kidney failure less than 15 ml/min/1 73 sq  meters      Lipase [[id number]]  (Normal) Collected:  10/19/18 1147    Lab Status:  Final result Specimen:  Blood from Arm, Left Updated:  10/19/18 1222     Lipase 92 u/L     Urine Microscopic [[id number]]  (Abnormal) Collected:  10/19/18 1154    Lab Status:  Final result Specimen:  Urine from Urine, Clean Catch Updated:  10/19/18 1218     RBC, UA 2-4 (A) /hpf      WBC, UA 2-4 (A) /hpf      Epithelial Cells None Seen /hpf      Bacteria, UA Occasional /hpf      Hyaline Casts, UA None Seen /lpf     CBC and differential [[id number]]  (Abnormal) Collected:  10/19/18 1147    Lab Status:  Final result Specimen:  Blood from Arm, Left Updated:  10/19/18 1154     WBC 10 66 (H) Thousand/uL      RBC 5 30 (H) Million/uL      Hemoglobin 15 1 g/dL      Hematocrit 45 7 %      MCV 86 fL      MCH 28 5 pg      MCHC 33 0 g/dL      RDW 12 7 %      MPV 9 2 fL      Platelets 142 Thousands/uL      nRBC 0 /100 WBCs      Neutrophils Relative 72 %      Immat GRANS % 0 %      Lymphocytes Relative 19 %      Monocytes Relative 6 %      Eosinophils Relative 3 %      Basophils Relative 0 %      Neutrophils Absolute 7 61 Thousands/µL      Immature Grans Absolute 0 03 Thousand/uL      Lymphocytes Absolute 2 06 Thousands/µL      Monocytes Absolute 0 62 Thousand/µL      Eosinophils Absolute 0 30 Thousand/µL      Basophils Absolute 0 04 Thousands/µL     POCT urinalysis dipstick [[id number]]  (Normal) Resulted:  10/19/18 1153    Lab Status:  Final result Specimen:  Urine Updated:  10/19/18 1153    POCT pregnancy, urine [[id number]]  (Normal) Resulted:  10/19/18 1152    Lab Status:  Final result Updated:  10/19/18 1153     EXT PREG TEST UR (Ref: Negative) neg    ED Urine Macroscopic [[id number]]  (Abnormal) Collected:  10/19/18 1154    Lab Status:  Final result Specimen:  Urine Updated:  10/19/18 1152     Color, UA Yellow     Clarity, UA Clear     pH, UA 6 0     Leukocytes, UA Trace (A)     Nitrite, UA Negative     Protein, UA 30 (1+) (A) mg/dl      Glucose, UA Negative mg/dl      Ketones, UA 15 (1+) (A) mg/dl      Urobilinogen, UA 0 2 E U /dl      Bilirubin, UA Negative     Blood, UA Small (A)     Specific [place], UA 1 025    Narrative:       CLINITEK RESULT                 No orders to display         Procedures  Procedures      Phone Consults  ED Phone Contact    ED Course  ED Course as of Oct 19 1522   Fri Oct 19, 2018   1231 Protein, UA: (!) 30 (1+)   1231 Ketones, UA: (!) 15 (1+)   1231 Creatinine: 0 74                               MDM  Number of Diagnoses or Management Options  Cholelithiasis: new and requires workup  Epigastric pain: new and requires workup  Diagnosis management comments: Abdominal pain:  Patient complains of bilateral flank pain as well as epigastric and right upper quadrant pain  Etiology unclear as patient has history of cholelithiasis as well as gastritis  Bedside ultrasound positive for cholelithiasis  There is however no evidence of cholecystitis  Will check urinalysis, urine preg, CBC, CMP, lipase  Reassess  Patient is refusing pain medicine as her symptoms have resolved since presentation  Lab workup unremarkable  Will not order further imaging at this time  On re-evaluation patient complaining of epigastric pain  Will give GI cocktail and Pepcid  3:20 PM  Patient asymptomatic on re-evaluation  Will discharge home with follow-up for Gastroenterology as well as General Surgery for gastritis and cholelithiasis, respectively  Return precautions discussed  Patient written for Pepcid         Amount and/or Complexity of Data Reviewed  Clinical lab tests: ordered and reviewed  Decide to obtain previous medical records or to obtain history from someone other than the patient: yes  Obtain history from someone other than the patient: yes  Review and summarize past medical records: yes  Discuss the patient with other providers: yes  Independent visualization of images, tracings, or specimens: yes    Risk of Complications, Morbidity, and/or Mortality  Presenting problems: minimal  Diagnostic procedures: minimal  Management options: minimal    Patient Progress  Patient progress: stable    CritCare Time    Disposition  Final diagnoses:   Epigastric pain   Cholelithiasis     Time reflects when diagnosis was documented in both MDM as applicable and the Disposition within this note     Time User Action Codes Description Comment    10/19/2018  2:15 PM Savita Elizondo Add [R10 13] Epigastric pain     10/19/2018  2:32 PM Aguilar, [facility] [facility] [K80 20] Cholelithiasis       ED Disposition     ED Disposition Condition Comment    Discharge  Murali Sosa discharge to home/self care  Condition at discharge: Stable        Follow-up Information     Follow up With Specialties Details Why [facility] Emergency Department Emergency Medicine   [address]  [phone]  ED, [address] [place] [address], [facility]   Gastroenterology [address] [facility] [address]  [place] [id number] [address] [facility] Surgery  Cholelithiasis [address] [facility] [address] [place] [id number]  [address] [place] [address], [id number]          Discharge Medication List as of 10/19/2018  2:32 PM      START taking these medications    Details   famotidine (PEPCID) 20 mg tablet Take 1 tablet (20 mg total) by mouth 2 (two) times a day, Starting Fri 10/19/2018, Print           No discharge procedures on file  ED Provider  Attending physically available and evaluated Murali Sosa  TERRY managed the patient along with the ED Attending      Electronically Signed by         Oscar Gerard MD  10/19/18 5092

## 2018-10-19 NOTE — DISCHARGE INSTRUCTIONS
Abdominal Pain   WHAT YOU NEED TO KNOW:   Abdominal pain can be dull, achy, or sharp  You may have pain in one area of your abdomen, or in your entire abdomen  Your pain may be caused by a condition such as constipation, food sensitivity or poisoning, infection, or a blockage  Abdominal pain can also be from a hernia, appendicitis, or an ulcer  Liver, gallbladder, or kidney conditions can also cause abdominal pain  The cause of your abdominal pain may be unknown  DISCHARGE INSTRUCTIONS:   Return to the emergency department if:   · You have new chest pain or shortness of breath  · You have pulsing pain in your upper abdomen or lower back that suddenly becomes constant  · Your pain is in the right lower abdominal area and worsens with movement  · You have a fever over 100 4°F (38°C) or shaking chills  · You are vomiting and cannot keep food or liquids down  · Your pain does not improve or gets worse over the next 8 to 12 hours  · You see blood in your vomit or bowel movements, or they look black and tarry  · Your skin or the whites of your eyes turn yellow  · You are a woman and have a large amount of vaginal bleeding that is not your monthly period  Contact your healthcare provider if:   · You have pain in your lower back  · You are a man and have pain in your testicles  · You have pain when you urinate  · You have questions or concerns about your condition or care  Follow up with your healthcare provider within 24 hours or as directed:  Write down your questions so you remember to ask them during your visits  Medicines:   · Medicines  may be given to calm your stomach and prevent vomiting or to decrease pain  Ask how to take pain medicine safely  · Take your medicine as directed  Contact your healthcare provider if you think your medicine is not helping or if you have side effects  Tell him of her if you are allergic to any medicine   Keep a list of the medicines, vitamins, and herbs you take  Include the amounts, and when and why you take them  Bring the list or the pill bottles to follow-up visits  Carry your medicine list with you in case of an emergency  © 2017 [employer] Information is for End User's use only and may not be sold, redistributed or otherwise used for commercial purposes  All illustrations and images included in CareNotes® are the copyrighted property of A [employer] A M , Inc  or [employer]  The above information is an  only  It is not intended as medical advice for individual conditions or treatments  Talk to your doctor, nurse or pharmacist before following any medical regimen to see if it is safe and effective for you

## 2020-11-13 ENCOUNTER — HOSPITAL ENCOUNTER (EMERGENCY)
Facility: HOSPITAL | Age: 30
Discharge: HOME | End: 2020-11-13
Attending: EMERGENCY MEDICINE | Admitting: EMERGENCY MEDICINE
Payer: OTHER GOVERNMENT

## 2020-11-13 VITALS
DIASTOLIC BLOOD PRESSURE: 85 MMHG | BODY MASS INDEX: 30.27 KG/M2 | SYSTOLIC BLOOD PRESSURE: 135 MMHG | RESPIRATION RATE: 18 BRPM | TEMPERATURE: 98.1 F | WEIGHT: 155 LBS | HEART RATE: 90 BPM | OXYGEN SATURATION: 100 %

## 2020-11-13 DIAGNOSIS — J06.9 URI (UPPER RESPIRATORY INFECTION): Primary | ICD-10-CM

## 2020-11-13 PROCEDURE — 99283 EMERGENCY DEPT VISIT LOW MDM: CPT

## 2020-11-13 PROCEDURE — U0003 INFECTIOUS AGENT DETECTION BY NUCLEIC ACID (DNA OR RNA); SEVERE ACUTE RESPIRATORY SYNDROME CORONAVIRUS 2 (SARS-COV-2) (CORONAVIRUS DISEASE [COVID-19]), AMPLIFIED PROBE TECHNIQUE, MAKING USE OF HIGH THROUGHPUT TECHNOLOGIES AS DESCRIBED BY CMS-2020-01-R: HCPCS | Performed by: EMERGENCY MEDICINE

## 2020-11-13 PROCEDURE — 99282 EMERGENCY DEPT VISIT SF MDM: CPT | Performed by: EMERGENCY MEDICINE

## 2020-11-13 RX ORDER — ACETAMINOPHEN 325 MG/1
650 TABLET ORAL ONCE
Status: COMPLETED | OUTPATIENT
Start: 2020-11-13 | End: 2020-11-13

## 2020-11-13 RX ADMIN — ACETAMINOPHEN 650 MG: 325 TABLET, FILM COATED ORAL at 09:57

## 2020-11-14 LAB — SARS-COV-2 RNA SPEC QL NAA+PROBE: DETECTED

## 2023-03-06 ENCOUNTER — OFFICE VISIT (OUTPATIENT)
Dept: OBGYN CLINIC | Facility: CLINIC | Age: 33
End: 2023-03-06

## 2023-03-06 VITALS
HEART RATE: 106 BPM | DIASTOLIC BLOOD PRESSURE: 99 MMHG | HEIGHT: 60 IN | WEIGHT: 154 LBS | BODY MASS INDEX: 30.23 KG/M2 | SYSTOLIC BLOOD PRESSURE: 137 MMHG | RESPIRATION RATE: 18 BRPM

## 2023-03-06 DIAGNOSIS — Z20.2 POSSIBLE EXPOSURE TO STD: ICD-10-CM

## 2023-03-06 DIAGNOSIS — Z32.02 PREGNANCY TEST NEGATIVE: ICD-10-CM

## 2023-03-06 DIAGNOSIS — Z97.5 IUD (INTRAUTERINE DEVICE) IN PLACE: ICD-10-CM

## 2023-03-06 DIAGNOSIS — R10.2 PELVIC PAIN: Primary | ICD-10-CM

## 2023-03-06 LAB
BV WHIFF TEST VAG QL: NORMAL
CLUE CELLS SPEC QL WET PREP: NORMAL
PH SMN: 4.5 [PH]
SL AMB POCT URINE HCG: NORMAL
SL AMB POCT WET MOUNT: NORMAL
T VAGINALIS VAG QL WET PREP: NORMAL
YEAST VAG QL WET PREP: NORMAL

## 2023-03-06 RX ORDER — PHENTERMINE HYDROCHLORIDE 37.5 MG/1
37.5 CAPSULE ORAL
COMMUNITY

## 2023-03-06 RX ORDER — COPPER 313.4 MG/1
1 INTRAUTERINE DEVICE INTRAUTERINE
COMMUNITY

## 2023-03-06 NOTE — PROGRESS NOTES
Assessment/Plan:    No problem-specific Assessment & Plan notes found for this encounter  RTO for annual 2-3 wks     Diagnoses and all orders for this visit:    Pelvic pain  -     US pelvis complete w transvaginal; Future  Reviewed precautions     IUD (intrauterine device) in place  -     US pelvis complete w transvaginal; Future    Possible exposure to STD  -     Chlamydia/GC amplified DNA by PCR    Other orders  -     intrauterine copper (PARAGARD) IUD; 1 each by Intrauterine route  -     phentermine 37 5 MG capsule; Take 37 5 mg by mouth (Patient not taking: Reported on 3/6/2023)          Subjective:      Patient ID: Yelena Hankins is a 29 y o  female  HPI 28 yo new pt with pelvic pain for the past 1 month  She denies any urinary symptoms  She has a Paragard IUD that was placed 3/16/2016  Placed about 6 5 years ago,   Usually gets pain 1 week prior to your menses and during menses  LMP 2/22/23, was 10 days late, which was unusual    UPT today is negative  Same sexual  Partner x 9 years  [facility]  Denies any vaginal symptoms  No pain today  Last time she had pain was 3 days  Symptoms last for a few minutes 3 x daily  Reviewed infections, IUD malpositions, adnexal cysst can caue pain  The following portions of the patient's history were reviewed and updated as appropriate: allergies, current medications, past family history, past medical history, past social history, past surgical history and problem list     Review of Systems      Objective:      /99 (BP Location: Right arm, Patient Position: Standing, Cuff Size: Adult)   Pulse (!) 106   Resp 18   Ht 5' (1 524 m)   Wt 69 9 kg (154 lb)   BMI 30 08 kg/m²          Physical Exam  Constitutional:       Appearance: Normal appearance  Cardiovascular:      Rate and Rhythm: Normal rate  Pulmonary:      Effort: Pulmonary effort is normal    Abdominal:      Palpations: Abdomen is soft  Tenderness:  There is no abdominal tenderness  Skin:     General: Skin is warm and dry  Neurological:      Mental Status: She is oriented to person, place, and time     Psychiatric:         Mood and Affect: Mood normal          Behavior: Behavior normal        External genitalia-no lesions or erythema  Vagina- thick white discharge present   Cervix-negative CMT no lesions, iUD strings present  Uterus-anteverted, nontender  Adnexa-no masses nontender    Wet mount KOH-negative

## 2023-03-07 LAB
C TRACH DNA SPEC QL NAA+PROBE: NEGATIVE
N GONORRHOEA DNA SPEC QL NAA+PROBE: NEGATIVE

## 2023-03-08 ENCOUNTER — TELEPHONE (OUTPATIENT)
Dept: OBGYN CLINIC | Facility: CLINIC | Age: 33
End: 2023-03-08

## 2023-03-08 NOTE — TELEPHONE ENCOUNTER
----- Message from Fifi Vasquez, [address] sent at 3/7/2023  7:53 AM EST -----   Culture for chlamydia and gonorrhea result is negative, please call patient to inform     Thank you

## 2023-03-08 NOTE — TELEPHONE ENCOUNTER
Called and spoke to patient, informed her of the test results  Patient verbalized understanding, did not have any questions